# Patient Record
Sex: MALE | Race: WHITE | NOT HISPANIC OR LATINO | URBAN - METROPOLITAN AREA
[De-identification: names, ages, dates, MRNs, and addresses within clinical notes are randomized per-mention and may not be internally consistent; named-entity substitution may affect disease eponyms.]

---

## 2020-08-12 ENCOUNTER — OUT OF OFFICE VISIT (OUTPATIENT)
Dept: URBAN - METROPOLITAN AREA MEDICAL CENTER 1 | Facility: MEDICAL CENTER | Age: 50
End: 2020-08-12
Payer: MEDICARE

## 2020-08-12 DIAGNOSIS — R10.32 ABDOMINAL CRAMPING IN LEFT LOWER QUADRANT: ICD-10-CM

## 2020-08-12 DIAGNOSIS — R10.31 ABDOMINAL DISCOMFORT IN RIGHT LOWER QUADRANT: ICD-10-CM

## 2020-08-12 DIAGNOSIS — R93.3 ABN FINDINGS-GI TRACT: ICD-10-CM

## 2020-08-12 DIAGNOSIS — J44.1 CHRONIC OBSTRUCTIVE PULMONARY DISEASE WITH (ACUTE) EXACERBATION: ICD-10-CM

## 2020-08-12 PROCEDURE — G8427 DOCREV CUR MEDS BY ELIG CLIN: HCPCS | Performed by: INTERNAL MEDICINE

## 2020-08-12 PROCEDURE — 99232 SBSQ HOSP IP/OBS MODERATE 35: CPT | Performed by: INTERNAL MEDICINE

## 2020-08-12 PROCEDURE — 99233 SBSQ HOSP IP/OBS HIGH 50: CPT | Performed by: INTERNAL MEDICINE

## 2020-08-12 PROCEDURE — 99222 1ST HOSP IP/OBS MODERATE 55: CPT | Performed by: INTERNAL MEDICINE

## 2020-08-15 ENCOUNTER — OUT OF OFFICE VISIT (OUTPATIENT)
Dept: URBAN - METROPOLITAN AREA MEDICAL CENTER 1 | Facility: MEDICAL CENTER | Age: 50
End: 2020-08-15
Payer: MEDICARE

## 2020-08-15 DIAGNOSIS — R93.3 ABN FINDINGS-GI TRACT: ICD-10-CM

## 2020-08-15 DIAGNOSIS — R10.32 ABDOMINAL CRAMPING IN LEFT LOWER QUADRANT: ICD-10-CM

## 2020-08-15 DIAGNOSIS — R10.31 ABDOMINAL DISCOMFORT IN RIGHT LOWER QUADRANT: ICD-10-CM

## 2020-08-15 DIAGNOSIS — J44.1 CHRONIC OBSTRUCTIVE PULMONARY DISEASE WITH (ACUTE) EXACERBATION: ICD-10-CM

## 2020-08-15 PROCEDURE — 99232 SBSQ HOSP IP/OBS MODERATE 35: CPT | Performed by: INTERNAL MEDICINE

## 2020-08-18 ENCOUNTER — OUT OF OFFICE VISIT (OUTPATIENT)
Dept: URBAN - METROPOLITAN AREA MEDICAL CENTER 1 | Facility: MEDICAL CENTER | Age: 50
End: 2020-08-18
Payer: MEDICARE

## 2020-08-18 DIAGNOSIS — K31.89 ACQUIRED DEFORMITY OF DUODENUM: ICD-10-CM

## 2020-08-18 DIAGNOSIS — R93.3 ABN FINDINGS-GI TRACT: ICD-10-CM

## 2020-08-18 DIAGNOSIS — K62.3 RECTAL MUCOSA PROLAPSE: ICD-10-CM

## 2020-08-18 DIAGNOSIS — K63.5 BENIGN COLON POLYP: ICD-10-CM

## 2020-08-18 PROCEDURE — 43239 EGD BIOPSY SINGLE/MULTIPLE: CPT | Performed by: INTERNAL MEDICINE

## 2020-08-18 PROCEDURE — 45385 COLONOSCOPY W/LESION REMOVAL: CPT | Performed by: INTERNAL MEDICINE

## 2020-08-18 PROCEDURE — 45380 COLONOSCOPY AND BIOPSY: CPT | Performed by: INTERNAL MEDICINE

## 2020-08-18 PROCEDURE — G9937 DIG OR SURV COLSCO: HCPCS | Performed by: INTERNAL MEDICINE

## 2020-08-19 ENCOUNTER — OUT OF OFFICE VISIT (OUTPATIENT)
Dept: URBAN - METROPOLITAN AREA MEDICAL CENTER 1 | Facility: MEDICAL CENTER | Age: 50
End: 2020-08-19
Payer: MEDICARE

## 2020-08-19 DIAGNOSIS — R93.3 ABN FINDINGS-GI TRACT: ICD-10-CM

## 2020-08-19 DIAGNOSIS — J44.1 CHRONIC OBSTRUCTIVE PULMONARY DISEASE WITH (ACUTE) EXACERBATION: ICD-10-CM

## 2020-08-19 DIAGNOSIS — R10.32 ABDOMINAL CRAMPING IN LEFT LOWER QUADRANT: ICD-10-CM

## 2020-08-19 DIAGNOSIS — R10.31: ICD-10-CM

## 2020-08-19 PROCEDURE — 99232 SBSQ HOSP IP/OBS MODERATE 35: CPT | Performed by: INTERNAL MEDICINE

## 2020-08-31 ENCOUNTER — OFFICE VISIT (OUTPATIENT)
Dept: URBAN - NONMETROPOLITAN AREA CLINIC 2 | Facility: CLINIC | Age: 50
End: 2020-08-31
Payer: MEDICARE

## 2020-08-31 DIAGNOSIS — R93.5 ABNORMAL FINDINGS ON DIAGNOSTIC IMAGING OF ABDOMEN: ICD-10-CM

## 2020-08-31 DIAGNOSIS — K59.04 CHRONIC IDIOPATHIC CONSTIPATION: ICD-10-CM

## 2020-08-31 DIAGNOSIS — K25.9 GASTRIC ULCER DUE TO CHEMICAL: ICD-10-CM

## 2020-08-31 PROCEDURE — G9903 PT SCRN TBCO ID AS NON USER: HCPCS | Performed by: NURSE PRACTITIONER

## 2020-08-31 PROCEDURE — G8427 DOCREV CUR MEDS BY ELIG CLIN: HCPCS | Performed by: NURSE PRACTITIONER

## 2020-08-31 PROCEDURE — G8420 CALC BMI NORM PARAMETERS: HCPCS | Performed by: NURSE PRACTITIONER

## 2020-08-31 PROCEDURE — 99213 OFFICE O/P EST LOW 20 MIN: CPT | Performed by: NURSE PRACTITIONER

## 2020-08-31 RX ORDER — GABAPENTIN 100 MG/1
1 CAPSULE CAPSULE ORAL ONCE A DAY
Status: ACTIVE | COMMUNITY

## 2020-08-31 RX ORDER — ASPIRIN 81 MG/1
1 TABLET TABLET, COATED ORAL ONCE A DAY
Status: ACTIVE | COMMUNITY

## 2020-08-31 RX ORDER — MONTELUKAST SODIUM 10 MG/1
TAKE 1 TABLET (10 MG) BY ORAL ROUTE ONCE DAILY IN THE EVENING TABLET, FILM COATED ORAL 1
Qty: 0 | Refills: 0 | Status: ACTIVE | COMMUNITY
Start: 1900-01-01

## 2020-08-31 RX ORDER — METFORMIN HYDROCHLORIDE 1000 MG/1
1 TABLET WITH A MEAL TABLET, FILM COATED ORAL ONCE A DAY
Status: ACTIVE | COMMUNITY

## 2020-08-31 RX ORDER — ATORVASTATIN CALCIUM 40 MG/1
1 TABLET TABLET, FILM COATED ORAL ONCE A DAY
Status: ACTIVE | COMMUNITY

## 2020-08-31 RX ORDER — HYDROCODONE BITARTRATE AND ACETAMINOPHEN 5; 325 MG/1; MG/1
1 TABLET AS NEEDED TABLET ORAL
Status: ACTIVE | COMMUNITY

## 2020-08-31 RX ORDER — LORAZEPAM 2 MG/1
1 TABLET AT BEDTIME AS NEEDED TABLET ORAL ONCE A DAY
Status: ACTIVE | COMMUNITY

## 2020-08-31 RX ORDER — FUROSEMIDE 40 MG/1
TAKE 1 TABLET (40 MG) BY ORAL ROUTE 2 TIMES PER DAY TABLET ORAL 2
Qty: 0 | Refills: 0 | Status: ACTIVE | COMMUNITY
Start: 1900-01-01

## 2020-08-31 RX ORDER — HYDROXYZINE HYDROCHLORIDE 25 MG/ML
2 ML AS NEEDED INJECTION, SOLUTION INTRAMUSCULAR
Status: ACTIVE | COMMUNITY

## 2020-08-31 RX ORDER — PANTOPRAZOLE SODIUM 40 MG/1
1 TABLET TABLET, DELAYED RELEASE ORAL ONCE A DAY
Status: ACTIVE | COMMUNITY

## 2020-08-31 RX ORDER — SPIRONOLACTONE 50 MG/1
1 TABLET TABLET, FILM COATED ORAL ONCE A DAY
Status: ACTIVE | COMMUNITY

## 2020-08-31 RX ORDER — INSULIN HUMAN 500 [IU]/ML
AS DIRECTED INJECTION, SOLUTION SUBCUTANEOUS
Status: ACTIVE | COMMUNITY

## 2020-08-31 RX ORDER — CITALOPRAM 20 MG/1
TAKE 1 TABLET (20 MG) BY ORAL ROUTE ONCE DAILY TABLET ORAL 1
Qty: 0 | Refills: 0 | Status: ACTIVE | COMMUNITY
Start: 1900-01-01

## 2020-08-31 RX ORDER — LISINOPRIL 20 MG/1
TAKE 1 TABLET (20 MG) BY ORAL ROUTE ONCE DAILY TABLET ORAL 1
Qty: 0 | Refills: 0 | Status: ACTIVE | COMMUNITY
Start: 1900-01-01

## 2020-08-31 RX ORDER — HYDROCHLOROTHIAZIDE 25 MG/1
1 TABLET IN THE MORNING TABLET ORAL ONCE A DAY
Status: ACTIVE | COMMUNITY

## 2020-08-31 RX ORDER — BRIMONIDINE TARTRATE 2 MG/ML
1 DROP INTO AFFECTED EYE SOLUTION/ DROPS OPHTHALMIC
Status: ACTIVE | COMMUNITY

## 2020-08-31 RX ORDER — CEFDINIR 300 MG/1
AS DIRECTED CAPSULE ORAL
Status: ACTIVE | COMMUNITY

## 2020-08-31 RX ORDER — LACTULOSE 10 G/15ML
45 ML SOLUTION ORAL TID
Qty: 4050 ML | Refills: 6 | OUTPATIENT
Start: 2020-08-31 | End: 2021-03-29

## 2020-08-31 RX ORDER — INSULIN DETEMIR 100 [IU]/ML
INJECT BY SUBCUTANEOUS ROUTE PER PRESCRIBER'S INSTRUCTIONS. INSULIN DOSING REQUIRES INDIVIDUALIZATION INJECTION, SOLUTION SUBCUTANEOUS
Qty: 1 | Refills: 0 | Status: ACTIVE | COMMUNITY
Start: 1900-01-01

## 2020-08-31 NOTE — HPI-TODAY'S VISIT:
Mr. Rodriguez is here for hospital f/u. He started having a lot of abdominal pain and feeling weak bringing him to the ER for further evaluation. He had a CT scan that showed concentric wall thickening of small bowel with extensive inflammatory changes in the adjacent mesaenery. CTE 3 days later showed considerable inflammatory change with multiple areas of focal perforation with air in neha mesentery. He had an EGD with a 1cm ulcer in the stomach and colonoscopy showed erythema with ulcers in the ascending colon. Two hyperplastic were removed. Path returned with normal colon tissue, no hpylori, and chronic mild inflammation in the small bowel. He has been on antibx and still has a few days left. He admits to take 4 Goody powders a day along with alleve. He is on protonix with some mild improvement. The pain is worse after eating. He has chronic constipation. He is on lactulose which is helping. He denies any blood in his stool or melena. CS

## 2020-08-31 NOTE — PHYSICAL EXAM GASTROINTESTINAL
Abdomen , soft, right mid abdominal tenderness, nondistended , no guarding or rigidity , no masses palpable , normal bowel sounds , Liver and Spleen , no hepatosplenomegaly , liver nontender , spleen not palpable

## 2020-08-31 NOTE — PHYSICAL EXAM CONSTITUTIONAL:
well developed, obese , in no acute distress , ambulating without difficulty , normal communication ability

## 2020-08-31 NOTE — PHYSICAL EXAM HENT:
Head,  normocephalic,  atraumatic,  Face,  Face within normal limits,  Ears,  External ears within normal limits,  Nose/Nasopharynx,  External nose  normal appearance,  Lips,  Appearance normal

## 2020-08-31 NOTE — HPI-OTHER HISTORIES
2/23/2017  Mr. Juanito Rodriguez is here for f/u of abnormal imaging of his liver and spleen. He has COPD and sleep apnea. He has LE edema. He is on 3 liters of O2. His chronic liver work up is normal and his liver enzymes are normal. He has stopped drinking alcohol. His weight is down 8 pounds.  He has been complaining of chest pain for the last 2 months. The pain is mid chest and then radiated down both sides of his abdomen. The pain has no triggers. The pain will resolve on its own. He has heartburn which improves with TUMs. He has SOB after eating. He was started on omeprazole OTC. Since starting this, his chest pain is slightly better but continues.  He was having diarrhea 3-4 times a day. This had been going on the last 2 months. His metformin was increased to 2 a day about that same time. After his OV, his diarrhea resolved. He did not complete the stool studies. He is taking Align.  CS

## 2020-09-23 ENCOUNTER — OFFICE VISIT (OUTPATIENT)
Dept: URBAN - NONMETROPOLITAN AREA CLINIC 2 | Facility: CLINIC | Age: 50
End: 2020-09-23

## 2020-09-28 ENCOUNTER — LAB OUTSIDE AN ENCOUNTER (OUTPATIENT)
Dept: URBAN - NONMETROPOLITAN AREA CLINIC 2 | Facility: CLINIC | Age: 50
End: 2020-09-28

## 2020-09-30 ENCOUNTER — OFFICE VISIT (OUTPATIENT)
Dept: URBAN - NONMETROPOLITAN AREA CLINIC 2 | Facility: CLINIC | Age: 50
End: 2020-09-30

## 2020-10-07 ENCOUNTER — OFFICE VISIT (OUTPATIENT)
Dept: URBAN - NONMETROPOLITAN AREA CLINIC 2 | Facility: CLINIC | Age: 50
End: 2020-10-07
Payer: MEDICARE

## 2020-10-07 DIAGNOSIS — K25.9 GASTRIC ULCER DUE TO CHEMICAL: ICD-10-CM

## 2020-10-07 DIAGNOSIS — R93.5 ABNORMAL FINDINGS ON DIAGNOSTIC IMAGING OF ABDOMEN: ICD-10-CM

## 2020-10-07 DIAGNOSIS — K59.04 CHRONIC IDIOPATHIC CONSTIPATION: ICD-10-CM

## 2020-10-07 PROCEDURE — G9903 PT SCRN TBCO ID AS NON USER: HCPCS | Performed by: NURSE PRACTITIONER

## 2020-10-07 PROCEDURE — 99213 OFFICE O/P EST LOW 20 MIN: CPT | Performed by: NURSE PRACTITIONER

## 2020-10-07 PROCEDURE — G8427 DOCREV CUR MEDS BY ELIG CLIN: HCPCS | Performed by: NURSE PRACTITIONER

## 2020-10-07 PROCEDURE — G8417 CALC BMI ABV UP PARAM F/U: HCPCS | Performed by: NURSE PRACTITIONER

## 2020-10-07 RX ORDER — METFORMIN HYDROCHLORIDE 1000 MG/1
1 TABLET WITH A MEAL TABLET, FILM COATED ORAL ONCE A DAY
Status: ACTIVE | COMMUNITY

## 2020-10-07 RX ORDER — MONTELUKAST SODIUM 10 MG/1
TAKE 1 TABLET (10 MG) BY ORAL ROUTE ONCE DAILY IN THE EVENING TABLET, FILM COATED ORAL 1
Qty: 0 | Refills: 0 | Status: ACTIVE | COMMUNITY
Start: 1900-01-01

## 2020-10-07 RX ORDER — ATORVASTATIN CALCIUM 40 MG/1
1 TABLET TABLET, FILM COATED ORAL ONCE A DAY
Status: ACTIVE | COMMUNITY

## 2020-10-07 RX ORDER — LACTULOSE 10 G/15ML
45 ML SOLUTION ORAL TID
Qty: 4050 ML | Refills: 11 | OUTPATIENT

## 2020-10-07 RX ORDER — HYDROXYZINE HYDROCHLORIDE 25 MG/ML
2 ML AS NEEDED INJECTION, SOLUTION INTRAMUSCULAR
Status: ACTIVE | COMMUNITY

## 2020-10-07 RX ORDER — INSULIN DETEMIR 100 [IU]/ML
INJECT BY SUBCUTANEOUS ROUTE PER PRESCRIBER'S INSTRUCTIONS. INSULIN DOSING REQUIRES INDIVIDUALIZATION INJECTION, SOLUTION SUBCUTANEOUS
Qty: 1 | Refills: 0 | Status: ACTIVE | COMMUNITY
Start: 1900-01-01

## 2020-10-07 RX ORDER — HYDROCODONE BITARTRATE AND ACETAMINOPHEN 5; 325 MG/1; MG/1
1 TABLET AS NEEDED TABLET ORAL
Status: ACTIVE | COMMUNITY

## 2020-10-07 RX ORDER — BRIMONIDINE TARTRATE 2 MG/ML
1 DROP INTO AFFECTED EYE SOLUTION/ DROPS OPHTHALMIC
Status: ACTIVE | COMMUNITY

## 2020-10-07 RX ORDER — CITALOPRAM 20 MG/1
TAKE 1 TABLET (20 MG) BY ORAL ROUTE ONCE DAILY TABLET ORAL 1
Qty: 0 | Refills: 0 | Status: ACTIVE | COMMUNITY
Start: 1900-01-01

## 2020-10-07 RX ORDER — SPIRONOLACTONE 50 MG/1
1 TABLET TABLET, FILM COATED ORAL ONCE A DAY
Status: ACTIVE | COMMUNITY

## 2020-10-07 RX ORDER — ASPIRIN 81 MG/1
1 TABLET TABLET, COATED ORAL ONCE A DAY
Status: ACTIVE | COMMUNITY

## 2020-10-07 RX ORDER — GABAPENTIN 100 MG/1
1 CAPSULE CAPSULE ORAL ONCE A DAY
Status: ACTIVE | COMMUNITY

## 2020-10-07 RX ORDER — HYDROCHLOROTHIAZIDE 25 MG/1
1 TABLET IN THE MORNING TABLET ORAL ONCE A DAY
Status: ACTIVE | COMMUNITY

## 2020-10-07 RX ORDER — FUROSEMIDE 40 MG/1
TAKE 1 TABLET (40 MG) BY ORAL ROUTE 2 TIMES PER DAY TABLET ORAL 2
Qty: 0 | Refills: 0 | Status: ACTIVE | COMMUNITY
Start: 1900-01-01

## 2020-10-07 RX ORDER — PANTOPRAZOLE SODIUM 40 MG/1
1 TABLET TABLET, DELAYED RELEASE ORAL ONCE A DAY
Qty: 90 | Refills: 3

## 2020-10-07 RX ORDER — LISINOPRIL 20 MG/1
TAKE 1 TABLET (20 MG) BY ORAL ROUTE ONCE DAILY TABLET ORAL 1
Qty: 0 | Refills: 0 | Status: ACTIVE | COMMUNITY
Start: 1900-01-01

## 2020-10-07 RX ORDER — PANTOPRAZOLE SODIUM 40 MG/1
1 TABLET TABLET, DELAYED RELEASE ORAL ONCE A DAY
Status: ACTIVE | COMMUNITY

## 2020-10-07 RX ORDER — INSULIN HUMAN 500 [IU]/ML
AS DIRECTED INJECTION, SOLUTION SUBCUTANEOUS
Status: ACTIVE | COMMUNITY

## 2020-10-07 RX ORDER — LORAZEPAM 2 MG/1
1 TABLET AT BEDTIME AS NEEDED TABLET ORAL ONCE A DAY
Status: ACTIVE | COMMUNITY

## 2020-10-07 RX ORDER — LACTULOSE 10 G/15ML
45 ML SOLUTION ORAL TID
Qty: 4050 ML | Refills: 6 | Status: ACTIVE | COMMUNITY
Start: 2020-08-31 | End: 2021-03-29

## 2020-10-07 RX ORDER — CEFDINIR 300 MG/1
AS DIRECTED CAPSULE ORAL
Status: ACTIVE | COMMUNITY

## 2020-10-07 NOTE — HPI-OTHER HISTORIES
2/23/2017  Mr. Juanito Rodriguez is here for f/u of abnormal imaging of his liver and spleen. He has COPD and sleep apnea. He has LE edema. He is on 3 liters of O2. His chronic liver work up is normal and his liver enzymes are normal. He has stopped drinking alcohol. His weight is down 8 pounds.  He has been complaining of chest pain for the last 2 months. The pain is mid chest and then radiated down both sides of his abdomen. The pain has no triggers. The pain will resolve on its own. He has heartburn which improves with TUMs. He has SOB after eating. He was started on omeprazole OTC. Since starting this, his chest pain is slightly better but continues.  He was having diarrhea 3-4 times a day. This had been going on the last 2 months. His metformin was increased to 2 a day about that same time. After his OV, his diarrhea resolved. He did not complete the stool studies. He is taking Align.     8/31/2020 Mr. Rodriguez is here for hospital f/u. He started having a lot of abdominal pain and feeling weak bringing him to the ER for further evaluation. He had a CT scan that showed concentric wall thickening of small bowel with extensive inflammatory changes in the adjacent mesaenery. CTE 3 days later showed considerable inflammatory change with multiple areas of focal perforation with air in neha mesentery. He had an EGD with a 1cm ulcer in the stomach and colonoscopy showed erythema with ulcers in the ascending colon. Two hyperplastic were removed. Path returned with normal colon tissue, no hpylori, and chronic mild inflammation in the small bowel. He has been on antibx and still has a few days left. He admits to take 4 Goody powders a day along with alleve. He is on protonix with some mild improvement. The pain is worse after eating. He has chronic constipation. He is on lactulose which is helping. He denies any blood in his stool or melena.

## 2020-10-07 NOTE — PHYSICAL EXAM CONSTITUTIONAL:
chronically ill appearing, obese , in no acute distress , ambulating with difficulty , normal communication ability

## 2020-10-07 NOTE — HPI-TODAY'S VISIT:
Mr. Rodriguez is here for f/u of enteritis. In August, he had a CT scan that showed concentric wall thickening of small bowel with extensive inflammatory changes in the adjacent mesaenery and a  CTE that showed considerable inflammatory change with multiple areas of focal perforation with air in the mesentery. He had an EGD with a 1cm ulcer in the stomach and colonoscopy showed erythema with ulcers in the ascending colon. Two hyperplastic were removed. Path returned with normal colon tissue, no hpylori, and chronic mild inflammation in the small bowel. He was treated with antibx and protonix. He admited to take 4 Goody powders a day along with alleve. He was advised to continue protonix and stop all NSAIDs. He has repeat imaging 9/11 that showed considerable improvement in his inflammation with only a very small collection of air relatively confined. He denies any abdominal pain. He denies any constipation after starting the lactulose. From a GI standpoint, he feels well.  He has been having issues with his kidneys and lungs. [facility]. CS

## 2021-04-05 ENCOUNTER — LAB OUTSIDE AN ENCOUNTER (OUTPATIENT)
Dept: URBAN - NONMETROPOLITAN AREA CLINIC 2 | Facility: CLINIC | Age: 51
End: 2021-04-05

## 2021-04-07 ENCOUNTER — OFFICE VISIT (OUTPATIENT)
Dept: URBAN - NONMETROPOLITAN AREA CLINIC 2 | Facility: CLINIC | Age: 51
End: 2021-04-07
Payer: MEDICARE

## 2021-04-07 VITALS
BODY MASS INDEX: 41.99 KG/M2 | HEART RATE: 118 BPM | SYSTOLIC BLOOD PRESSURE: 128 MMHG | TEMPERATURE: 98.5 F | DIASTOLIC BLOOD PRESSURE: 69 MMHG | WEIGHT: 310 LBS | HEIGHT: 72 IN

## 2021-04-07 DIAGNOSIS — R93.5 ABNORMAL FINDINGS ON DIAGNOSTIC IMAGING OF ABDOMEN: ICD-10-CM

## 2021-04-07 DIAGNOSIS — K59.04 CHRONIC IDIOPATHIC CONSTIPATION: ICD-10-CM

## 2021-04-07 DIAGNOSIS — K25.9 GASTRIC ULCER DUE TO CHEMICAL: ICD-10-CM

## 2021-04-07 PROCEDURE — 99213 OFFICE O/P EST LOW 20 MIN: CPT | Performed by: NURSE PRACTITIONER

## 2021-04-07 RX ORDER — HYDROCODONE BITARTRATE AND ACETAMINOPHEN 5; 325 MG/1; MG/1
1 TABLET AS NEEDED TABLET ORAL
Status: ACTIVE | COMMUNITY

## 2021-04-07 RX ORDER — LACTULOSE 10 G/15ML
45 ML SOLUTION ORAL TID
Qty: 4050 ML | Refills: 11 | Status: ACTIVE | COMMUNITY

## 2021-04-07 RX ORDER — HYDROCHLOROTHIAZIDE 25 MG/1
1 TABLET IN THE MORNING TABLET ORAL ONCE A DAY
Status: ACTIVE | COMMUNITY

## 2021-04-07 RX ORDER — INSULIN DETEMIR 100 [IU]/ML
INJECT BY SUBCUTANEOUS ROUTE PER PRESCRIBER'S INSTRUCTIONS. INSULIN DOSING REQUIRES INDIVIDUALIZATION INJECTION, SOLUTION SUBCUTANEOUS
Qty: 1 | Refills: 0 | Status: ACTIVE | COMMUNITY
Start: 1900-01-01

## 2021-04-07 RX ORDER — METFORMIN HYDROCHLORIDE 1000 MG/1
1 TABLET WITH A MEAL TABLET, FILM COATED ORAL ONCE A DAY
Status: ACTIVE | COMMUNITY

## 2021-04-07 RX ORDER — FUROSEMIDE 40 MG/1
TAKE 1 TABLET (40 MG) BY ORAL ROUTE 2 TIMES PER DAY TABLET ORAL 2
Qty: 0 | Refills: 0 | Status: ACTIVE | COMMUNITY
Start: 1900-01-01

## 2021-04-07 RX ORDER — PANTOPRAZOLE SODIUM 40 MG/1
1 TABLET TABLET, DELAYED RELEASE ORAL ONCE A DAY
Qty: 90 | Refills: 3 | Status: ACTIVE | COMMUNITY

## 2021-04-07 RX ORDER — INSULIN HUMAN 500 [IU]/ML
AS DIRECTED INJECTION, SOLUTION SUBCUTANEOUS
Status: ACTIVE | COMMUNITY

## 2021-04-07 RX ORDER — PANTOPRAZOLE SODIUM 20 MG/1
1 TABLET TABLET, DELAYED RELEASE ORAL ONCE A DAY
Qty: 90 | Refills: 3

## 2021-04-07 RX ORDER — GABAPENTIN 100 MG/1
1 CAPSULE CAPSULE ORAL ONCE A DAY
Status: ACTIVE | COMMUNITY

## 2021-04-07 RX ORDER — FAMOTIDINE 20 MG/1
1 TABLET AT BEDTIME AS NEEDED TABLET, FILM COATED ORAL ONCE A DAY
Qty: 90 TABLET | Refills: 11 | OUTPATIENT
Start: 2021-04-07

## 2021-04-07 RX ORDER — ASPIRIN 81 MG/1
1 TABLET TABLET, COATED ORAL ONCE A DAY
Status: ACTIVE | COMMUNITY

## 2021-04-07 RX ORDER — LISINOPRIL 20 MG/1
TAKE 1 TABLET (20 MG) BY ORAL ROUTE ONCE DAILY TABLET ORAL 1
Qty: 0 | Refills: 0 | Status: ACTIVE | COMMUNITY
Start: 1900-01-01

## 2021-04-07 RX ORDER — BRIMONIDINE TARTRATE 2 MG/ML
1 DROP INTO AFFECTED EYE SOLUTION/ DROPS OPHTHALMIC
Status: ACTIVE | COMMUNITY

## 2021-04-07 RX ORDER — CITALOPRAM 20 MG/1
TAKE 1 TABLET (20 MG) BY ORAL ROUTE ONCE DAILY TABLET ORAL 1
Qty: 0 | Refills: 0 | Status: ACTIVE | COMMUNITY
Start: 1900-01-01

## 2021-04-07 RX ORDER — HYDROXYZINE HYDROCHLORIDE 25 MG/ML
2 ML AS NEEDED INJECTION, SOLUTION INTRAMUSCULAR
Status: ACTIVE | COMMUNITY

## 2021-04-07 RX ORDER — LACTULOSE 10 G/15ML
45 ML SOLUTION ORAL TID
Qty: 4050 ML | Refills: 11 | OUTPATIENT

## 2021-04-07 RX ORDER — SPIRONOLACTONE 50 MG/1
1 TABLET TABLET, FILM COATED ORAL ONCE A DAY
Status: ACTIVE | COMMUNITY

## 2021-04-07 RX ORDER — ATORVASTATIN CALCIUM 40 MG/1
1 TABLET TABLET, FILM COATED ORAL ONCE A DAY
Status: ACTIVE | COMMUNITY

## 2021-04-07 RX ORDER — CEFDINIR 300 MG/1
AS DIRECTED CAPSULE ORAL
Status: ACTIVE | COMMUNITY

## 2021-04-07 RX ORDER — MONTELUKAST SODIUM 10 MG/1
TAKE 1 TABLET (10 MG) BY ORAL ROUTE ONCE DAILY IN THE EVENING TABLET, FILM COATED ORAL 1
Qty: 0 | Refills: 0 | Status: ACTIVE | COMMUNITY
Start: 1900-01-01

## 2021-04-07 RX ORDER — LORAZEPAM 2 MG/1
1 TABLET AT BEDTIME AS NEEDED TABLET ORAL ONCE A DAY
Status: ACTIVE | COMMUNITY

## 2021-04-07 NOTE — HPI-TODAY'S VISIT:
4/7/2021 Mr. Rodriguez is here for f/u of enteritis. In August, he had a CT scan that showed concentric wall thickening of small bowel with extensive inflammatory changes in the adjacent mesaenery and a  CTE that showed considerable inflammatory change with multiple areas of focal perforation with air in the mesentery. He had an EGD with a 1cm ulcer in the stomach and colonoscopy showed erythema with ulcers in the ascending colon. Two hyperplastic were removed. Path returned with normal colon tissue, no hpylori, and chronic mild inflammation in the small bowel. He was treated with antibx and protonix. He had repeat imaging 9/11 that showed considerable improvement in his inflammation with only a very small collection of air relatively confined. His last CT was 3/29/2021 with resolving inflammation in neha anterior right mid abdomen mesentery.  He denies any abdominal pain. He has stopped all NSAIDs. He is drinking about one beer a couple times a month. He is drinking about 1 caffeine drink a day. He is taking protonix 40mg daily without any breakthrough.  He denies any constipation after starting the lactulose. He has been trying to loss weight. Overall, he is feeling well. CS

## 2021-04-07 NOTE — PHYSICAL EXAM CONSTITUTIONAL:
well developed, well nourished , obese in no acute distress , ambulating with difficulty , normal communication ability

## 2021-04-07 NOTE — HPI-OTHER HISTORIES
2/23/2017  Mr. Juanito Rodriguez is here for f/u of abnormal imaging of his liver and spleen. He has COPD and sleep apnea. He has LE edema. He is on 3 liters of O2. His chronic liver work up is normal and his liver enzymes are normal. He has stopped drinking alcohol. His weight is down 8 pounds.  He has been complaining of chest pain for the last 2 months. The pain is mid chest and then radiated down both sides of his abdomen. The pain has no triggers. The pain will resolve on its own. He has heartburn which improves with TUMs. He has SOB after eating. He was started on omeprazole OTC. Since starting this, his chest pain is slightly better but continues.  He was having diarrhea 3-4 times a day. This had been going on the last 2 months. His metformin was increased to 2 a day about that same time. After his OV, his diarrhea resolved. He did not complete the stool studies. He is taking Align.  CS   8/31/2020 Mr. Rodriguez is here for hospital f/u. He started having a lot of abdominal pain and feeling weak bringing him to the ER for further evaluation. He had a CT scan that showed concentric wall thickening of small bowel with extensive inflammatory changes in the adjacent mesaenery. CTE 3 days later showed considerable inflammatory change with multiple areas of focal perforation with air in neha mesentery. He had an EGD with a 1cm ulcer in the stomach and colonoscopy showed erythema with ulcers in the ascending colon. Two hyperplastic were removed. Path returned with normal colon tissue, no hpylori, and chronic mild inflammation in the small bowel. He has been on antibx and still has a few days left. He admits to take 4 Goody powders a day along with alleve. He is on protonix with some mild improvement. The pain is worse after eating. He has chronic constipation. He is on lactulose which is helping. He denies any blood in his stool or melena. CS   10/7/2020 Mr. Rodriguez is here for f/u of enteritis. In August, he had a CT scan that showed concentric wall thickening of small bowel with extensive inflammatory changes in the adjacent mesaenery and a  CTE that showed considerable inflammatory change with multiple areas of focal perforation with air in the mesentery. He had an EGD with a 1cm ulcer in the stomach and colonoscopy showed erythema with ulcers in the ascending colon. Two hyperplastic were removed. Path returned with normal colon tissue, no hpylori, and chronic mild inflammation in the small bowel. He was treated with antibx and protonix. He admited to take 4 Goody powders a day along with alleve. He was advised to continue protonix and stop all NSAIDs. He has repeat imaging 9/11 that showed considerable improvement in his inflammation with only a very small collection of air relatively confined. He denies any abdominal pain. He denies any constipation after starting the lactulose. From a GI standpoint, he feels well.  He has been having issues with his kidneys and lungs. [facility]. CS

## 2021-05-28 ENCOUNTER — ERX REFILL RESPONSE (OUTPATIENT)
Dept: URBAN - NONMETROPOLITAN AREA CLINIC 2 | Facility: CLINIC | Age: 51
End: 2021-05-28

## 2021-05-28 RX ORDER — LACTULOSE 10 G/15ML
TAKE 45 ML BY MOUTH THREE TIMES A DAY SOLUTION ORAL; RECTAL
Qty: 4050 | Refills: 5

## 2021-10-03 ENCOUNTER — ERX REFILL RESPONSE (OUTPATIENT)
Dept: URBAN - NONMETROPOLITAN AREA CLINIC 2 | Facility: CLINIC | Age: 51
End: 2021-10-03

## 2021-10-03 RX ORDER — PANTOPRAZOLE SODIUM 40 MG/1
TAKE ONE TABLET BY MOUTH ONE TIME DAILY TABLET, DELAYED RELEASE ORAL
Qty: 90 TABLET | Refills: 4 | OUTPATIENT

## 2021-10-06 ENCOUNTER — OFFICE VISIT (OUTPATIENT)
Dept: URBAN - NONMETROPOLITAN AREA CLINIC 2 | Facility: CLINIC | Age: 51
End: 2021-10-06
Payer: MEDICARE

## 2021-10-06 ENCOUNTER — WEB ENCOUNTER (OUTPATIENT)
Dept: URBAN - NONMETROPOLITAN AREA CLINIC 2 | Facility: CLINIC | Age: 51
End: 2021-10-06

## 2021-10-06 VITALS
TEMPERATURE: 97.8 F | DIASTOLIC BLOOD PRESSURE: 89 MMHG | HEART RATE: 104 BPM | HEIGHT: 72 IN | WEIGHT: 306.6 LBS | BODY MASS INDEX: 41.53 KG/M2 | SYSTOLIC BLOOD PRESSURE: 118 MMHG

## 2021-10-06 DIAGNOSIS — R93.5 ABNORMAL FINDINGS ON DIAGNOSTIC IMAGING OF ABDOMEN: ICD-10-CM

## 2021-10-06 DIAGNOSIS — K25.9 GASTRIC ULCER DUE TO CHEMICAL: ICD-10-CM

## 2021-10-06 DIAGNOSIS — K59.04 CHRONIC IDIOPATHIC CONSTIPATION: ICD-10-CM

## 2021-10-06 PROCEDURE — 99213 OFFICE O/P EST LOW 20 MIN: CPT | Performed by: NURSE PRACTITIONER

## 2021-10-06 RX ORDER — LORAZEPAM 2 MG/1
1 TABLET AT BEDTIME AS NEEDED TABLET ORAL ONCE A DAY
Status: ACTIVE | COMMUNITY

## 2021-10-06 RX ORDER — METFORMIN HYDROCHLORIDE 1000 MG/1
1 TABLET WITH A MEAL TABLET, FILM COATED ORAL ONCE A DAY
Status: ACTIVE | COMMUNITY

## 2021-10-06 RX ORDER — ATORVASTATIN CALCIUM 40 MG/1
1 TABLET TABLET, FILM COATED ORAL ONCE A DAY
Status: ACTIVE | COMMUNITY

## 2021-10-06 RX ORDER — FAMOTIDINE 20 MG/1
1 TABLET AT BEDTIME AS NEEDED TABLET, FILM COATED ORAL ONCE A DAY
Qty: 90 TABLET | Refills: 11 | Status: ACTIVE | COMMUNITY
Start: 2021-04-07

## 2021-10-06 RX ORDER — HYDROXYZINE HYDROCHLORIDE 25 MG/ML
2 ML AS NEEDED INJECTION, SOLUTION INTRAMUSCULAR
Status: ACTIVE | COMMUNITY

## 2021-10-06 RX ORDER — PANTOPRAZOLE SODIUM 20 MG/1
1 TABLET TABLET, DELAYED RELEASE ORAL ONCE A DAY
Qty: 90 | Refills: 3

## 2021-10-06 RX ORDER — BRIMONIDINE TARTRATE 2 MG/ML
1 DROP INTO AFFECTED EYE SOLUTION/ DROPS OPHTHALMIC
Status: ACTIVE | COMMUNITY

## 2021-10-06 RX ORDER — INSULIN DETEMIR 100 [IU]/ML
INJECT BY SUBCUTANEOUS ROUTE PER PRESCRIBER'S INSTRUCTIONS. INSULIN DOSING REQUIRES INDIVIDUALIZATION INJECTION, SOLUTION SUBCUTANEOUS
Qty: 1 | Refills: 0 | Status: ACTIVE | COMMUNITY
Start: 1900-01-01

## 2021-10-06 RX ORDER — SPIRONOLACTONE 50 MG/1
1 TABLET TABLET, FILM COATED ORAL ONCE A DAY
Status: ACTIVE | COMMUNITY

## 2021-10-06 RX ORDER — CITALOPRAM 20 MG/1
TAKE 1 TABLET (20 MG) BY ORAL ROUTE ONCE DAILY TABLET ORAL 1
Qty: 0 | Refills: 0 | Status: ACTIVE | COMMUNITY
Start: 1900-01-01

## 2021-10-06 RX ORDER — LISINOPRIL 20 MG/1
TAKE 1 TABLET (20 MG) BY ORAL ROUTE ONCE DAILY TABLET ORAL 1
Qty: 0 | Refills: 0 | Status: ACTIVE | COMMUNITY
Start: 1900-01-01

## 2021-10-06 RX ORDER — ASPIRIN 81 MG/1
1 TABLET TABLET, COATED ORAL ONCE A DAY
Status: ACTIVE | COMMUNITY

## 2021-10-06 RX ORDER — LACTULOSE 10 G/15ML
TAKE 45 ML BY MOUTH THREE TIMES A DAY SOLUTION ORAL; RECTAL
Qty: 4050 | Refills: 5 | Status: ACTIVE | COMMUNITY

## 2021-10-06 RX ORDER — MONTELUKAST SODIUM 10 MG/1
TAKE 1 TABLET (10 MG) BY ORAL ROUTE ONCE DAILY IN THE EVENING TABLET, FILM COATED ORAL 1
Qty: 0 | Refills: 0 | Status: ACTIVE | COMMUNITY
Start: 1900-01-01

## 2021-10-06 RX ORDER — FUROSEMIDE 40 MG/1
TAKE 1 TABLET (40 MG) BY ORAL ROUTE 2 TIMES PER DAY TABLET ORAL 2
Qty: 0 | Refills: 0 | Status: ACTIVE | COMMUNITY
Start: 1900-01-01

## 2021-10-06 RX ORDER — CEFDINIR 300 MG/1
AS DIRECTED CAPSULE ORAL
Status: ACTIVE | COMMUNITY

## 2021-10-06 RX ORDER — HYDROCODONE BITARTRATE AND ACETAMINOPHEN 5; 325 MG/1; MG/1
1 TABLET AS NEEDED TABLET ORAL
Status: ACTIVE | COMMUNITY

## 2021-10-06 RX ORDER — LACTULOSE 10 G/15ML
45 ML SOLUTION ORAL TID
Qty: 4050 ML | Refills: 11 | OUTPATIENT

## 2021-10-06 RX ORDER — GABAPENTIN 100 MG/1
1 CAPSULE CAPSULE ORAL ONCE A DAY
Status: ACTIVE | COMMUNITY

## 2021-10-06 RX ORDER — FAMOTIDINE 20 MG/1
1 TABLET AS NEEDED TABLET, FILM COATED ORAL BID
Qty: 180 TABLET | Refills: 11 | OUTPATIENT

## 2021-10-06 RX ORDER — LACTULOSE 10 G/15ML
45 ML SOLUTION ORAL TID
Qty: 4050 ML | Refills: 11 | Status: ACTIVE | COMMUNITY

## 2021-10-06 RX ORDER — PANTOPRAZOLE SODIUM 40 MG/1
TAKE ONE TABLET BY MOUTH ONE TIME DAILY TABLET, DELAYED RELEASE ORAL
Qty: 90 TABLET | Refills: 4 | Status: ACTIVE | COMMUNITY

## 2021-10-06 RX ORDER — HYDROCHLOROTHIAZIDE 25 MG/1
1 TABLET IN THE MORNING TABLET ORAL ONCE A DAY
Status: ACTIVE | COMMUNITY

## 2021-10-06 RX ORDER — INSULIN HUMAN 500 [IU]/ML
AS DIRECTED INJECTION, SOLUTION SUBCUTANEOUS
Status: ACTIVE | COMMUNITY

## 2021-10-06 RX ORDER — PANTOPRAZOLE SODIUM 20 MG/1
1 TABLET TABLET, DELAYED RELEASE ORAL ONCE A DAY
Qty: 90 | Refills: 3 | Status: ACTIVE | COMMUNITY

## 2021-10-06 NOTE — HPI-TODAY'S VISIT:
10/6/2021 Mr. Rodriguez is here for f/u of enteritis and GERD. In August 2020, he had a CT scan that showed concentric wall thickening of small bowel with extensive inflammatory changes in the adjacent mesaenery and a  CTE that showed considerable inflammatory change with multiple areas of focal perforation with air in the mesentery. He had an EGD with a 1cm ulcer in the stomach and colonoscopy showed erythema with ulcers in the ascending colon. His last CT was 3/29/2021 with resolving inflammation in the anterior right mid abdomen mesentery.  He denies any abdominal pain. He has stopped all NSAIDs. He is taking protonix 20mg daily and using TUMS prn. He has indigestion depending on what he eats. He is having good BM with lactulose. He has lost some weight and his breathing has gotten better. His BS are also better controlled. Overall, he is feeling much better and hoping to continue losing weight. CS

## 2021-10-06 NOTE — HPI-OTHER HISTORIES
2/23/2017  Mr. Juanito Rodriguez is here for f/u of abnormal imaging of his liver and spleen. He has COPD and sleep apnea. He has LE edema. He is on 3 liters of O2. His chronic liver work up is normal and his liver enzymes are normal. He has stopped drinking alcohol. His weight is down 8 pounds.  He has been complaining of chest pain for the last 2 months. The pain is mid chest and then radiated down both sides of his abdomen. The pain has no triggers. The pain will resolve on its own. He has heartburn which improves with TUMs. He has SOB after eating. He was started on omeprazole OTC. Since starting this, his chest pain is slightly better but continues.  He was having diarrhea 3-4 times a day. This had been going on the last 2 months. His metformin was increased to 2 a day about that same time. After his OV, his diarrhea resolved. He did not complete the stool studies. He is taking Align.  CS   8/31/2020 Mr. Rodriguez is here for hospital f/u. He started having a lot of abdominal pain and feeling weak bringing him to the ER for further evaluation. He had a CT scan that showed concentric wall thickening of small bowel with extensive inflammatory changes in the adjacent mesaenery. CTE 3 days later showed considerable inflammatory change with multiple areas of focal perforation with air in neha mesentery. He had an EGD with a 1cm ulcer in the stomach and colonoscopy showed erythema with ulcers in the ascending colon. Two hyperplastic were removed. Path returned with normal colon tissue, no hpylori, and chronic mild inflammation in the small bowel. He has been on antibx and still has a few days left. He admits to take 4 Goody powders a day along with alleve. He is on protonix with some mild improvement. The pain is worse after eating. He has chronic constipation. He is on lactulose which is helping. He denies any blood in his stool or melena. CS   10/7/2020 Mr. Rodriguez is here for f/u of enteritis. In August, he had a CT scan that showed concentric wall thickening of small bowel with extensive inflammatory changes in the adjacent mesaenery and a  CTE that showed considerable inflammatory change with multiple areas of focal perforation with air in the mesentery. He had an EGD with a 1cm ulcer in the stomach and colonoscopy showed erythema with ulcers in the ascending colon. Two hyperplastic were removed. Path returned with normal colon tissue, no hpylori, and chronic mild inflammation in the small bowel. He was treated with antibx and protonix. He admited to take 4 Goody powders a day along with alleve. He was advised to continue protonix and stop all NSAIDs. He has repeat imaging 9/11 that showed considerable improvement in his inflammation with only a very small collection of air relatively confined. He denies any abdominal pain. He denies any constipation after starting the lactulose. From a GI standpoint, he feels well.  He has been having issues with his kidneys and lungs. He was recently in the [facility] for a week. CS   4/7/2021 Mr. Rodriguez is here for f/u of enteritis. In August, he had a CT scan that showed concentric wall thickening of small bowel with extensive inflammatory changes in the adjacent mesaenery and a  CTE that showed considerable inflammatory change with multiple areas of focal perforation with air in the mesentery. He had an EGD with a 1cm ulcer in the stomach and colonoscopy showed erythema with ulcers in the ascending colon. Two hyperplastic were removed. Path returned with normal colon tissue, no hpylori, and chronic mild inflammation in the small bowel. He was treated with antibx and protonix. He had repeat imaging 9/11 that showed considerable improvement in his inflammation with only a very small collection of air relatively confined. His last CT was 3/29/2021 with resolving inflammation in neha anterior right mid abdomen mesentery.  He denies any abdominal pain. He has stopped all NSAIDs. He is drinking about one beer a couple times a month. He is drinking about 1 caffeine drink a day. He is taking protonix 40mg daily without any breakthrough.  He denies any constipation after starting the lactulose. He has been trying to loss weight. Overall, he is feeling well. CS

## 2022-04-06 ENCOUNTER — OFFICE VISIT (OUTPATIENT)
Dept: URBAN - NONMETROPOLITAN AREA CLINIC 2 | Facility: CLINIC | Age: 52
End: 2022-04-06

## 2022-04-13 ENCOUNTER — OFFICE VISIT (OUTPATIENT)
Dept: URBAN - NONMETROPOLITAN AREA CLINIC 2 | Facility: CLINIC | Age: 52
End: 2022-04-13
Payer: MEDICARE

## 2022-04-13 VITALS
HEIGHT: 72 IN | BODY MASS INDEX: 42.66 KG/M2 | SYSTOLIC BLOOD PRESSURE: 121 MMHG | DIASTOLIC BLOOD PRESSURE: 81 MMHG | WEIGHT: 315 LBS | HEART RATE: 105 BPM | TEMPERATURE: 98 F

## 2022-04-13 DIAGNOSIS — K25.9 GASTRIC ULCER DUE TO CHEMICAL: ICD-10-CM

## 2022-04-13 DIAGNOSIS — K59.04 CHRONIC IDIOPATHIC CONSTIPATION: ICD-10-CM

## 2022-04-13 DIAGNOSIS — R93.5 ABNORMAL FINDINGS ON DIAGNOSTIC IMAGING OF ABDOMEN: ICD-10-CM

## 2022-04-13 PROCEDURE — 99213 OFFICE O/P EST LOW 20 MIN: CPT | Performed by: NURSE PRACTITIONER

## 2022-04-13 RX ORDER — ASPIRIN 81 MG/1
1 TABLET TABLET, COATED ORAL ONCE A DAY
Status: ACTIVE | COMMUNITY

## 2022-04-13 RX ORDER — CEFDINIR 300 MG/1
AS DIRECTED CAPSULE ORAL
Status: ACTIVE | COMMUNITY

## 2022-04-13 RX ORDER — PANTOPRAZOLE SODIUM 40 MG/1
1 TABLET TABLET, DELAYED RELEASE ORAL BID
Qty: 180 | Refills: 3

## 2022-04-13 RX ORDER — INSULIN HUMAN 500 [IU]/ML
AS DIRECTED INJECTION, SOLUTION SUBCUTANEOUS
Status: ACTIVE | COMMUNITY

## 2022-04-13 RX ORDER — HYDROCODONE BITARTRATE AND ACETAMINOPHEN 5; 325 MG/1; MG/1
1 TABLET AS NEEDED TABLET ORAL
Status: ACTIVE | COMMUNITY

## 2022-04-13 RX ORDER — METFORMIN HYDROCHLORIDE 1000 MG/1
1 TABLET WITH A MEAL TABLET, FILM COATED ORAL ONCE A DAY
Status: ACTIVE | COMMUNITY

## 2022-04-13 RX ORDER — FUROSEMIDE 40 MG/1
TAKE 1 TABLET (40 MG) BY ORAL ROUTE 2 TIMES PER DAY TABLET ORAL 2
Qty: 0 | Refills: 0 | Status: ACTIVE | COMMUNITY
Start: 1900-01-01

## 2022-04-13 RX ORDER — FAMOTIDINE 40 MG/1
1 TABLET AT BEDTIME AS NEEDED TABLET, FILM COATED ORAL ONCE A DAY
Qty: 90 TABLET | Refills: 3 | OUTPATIENT

## 2022-04-13 RX ORDER — BRIMONIDINE TARTRATE 2 MG/ML
1 DROP INTO AFFECTED EYE SOLUTION/ DROPS OPHTHALMIC
Status: ACTIVE | COMMUNITY

## 2022-04-13 RX ORDER — LORAZEPAM 2 MG/1
1 TABLET AT BEDTIME AS NEEDED TABLET ORAL ONCE A DAY
Status: ACTIVE | COMMUNITY

## 2022-04-13 RX ORDER — MONTELUKAST SODIUM 10 MG/1
TAKE 1 TABLET (10 MG) BY ORAL ROUTE ONCE DAILY IN THE EVENING TABLET, FILM COATED ORAL 1
Qty: 0 | Refills: 0 | Status: ACTIVE | COMMUNITY
Start: 1900-01-01

## 2022-04-13 RX ORDER — ATORVASTATIN CALCIUM 40 MG/1
1 TABLET TABLET, FILM COATED ORAL ONCE A DAY
Status: ACTIVE | COMMUNITY

## 2022-04-13 RX ORDER — PANTOPRAZOLE SODIUM 40 MG/1
TAKE ONE TABLET BY MOUTH ONE TIME DAILY TABLET, DELAYED RELEASE ORAL
Qty: 90 TABLET | Refills: 4 | Status: ACTIVE | COMMUNITY

## 2022-04-13 RX ORDER — GABAPENTIN 100 MG/1
1 CAPSULE CAPSULE ORAL ONCE A DAY
Status: ACTIVE | COMMUNITY

## 2022-04-13 RX ORDER — HYDROXYZINE HYDROCHLORIDE 25 MG/ML
2 ML AS NEEDED INJECTION, SOLUTION INTRAMUSCULAR
Status: ACTIVE | COMMUNITY

## 2022-04-13 RX ORDER — PANTOPRAZOLE SODIUM 20 MG/1
1 TABLET TABLET, DELAYED RELEASE ORAL ONCE A DAY
Qty: 90 | Refills: 3 | Status: ACTIVE | COMMUNITY

## 2022-04-13 RX ORDER — INSULIN DETEMIR 100 [IU]/ML
INJECT BY SUBCUTANEOUS ROUTE PER PRESCRIBER'S INSTRUCTIONS. INSULIN DOSING REQUIRES INDIVIDUALIZATION INJECTION, SOLUTION SUBCUTANEOUS
Qty: 1 | Refills: 0 | Status: ACTIVE | COMMUNITY
Start: 1900-01-01

## 2022-04-13 RX ORDER — CITALOPRAM 20 MG/1
TAKE 1 TABLET (20 MG) BY ORAL ROUTE ONCE DAILY TABLET ORAL 1
Qty: 0 | Refills: 0 | Status: ACTIVE | COMMUNITY
Start: 1900-01-01

## 2022-04-13 RX ORDER — LACTULOSE 10 G/15ML
45 ML SOLUTION ORAL TID
Qty: 4050 ML | Refills: 11 | OUTPATIENT

## 2022-04-13 RX ORDER — LISINOPRIL 20 MG/1
TAKE 1 TABLET (20 MG) BY ORAL ROUTE ONCE DAILY TABLET ORAL 1
Qty: 0 | Refills: 0 | Status: ACTIVE | COMMUNITY
Start: 1900-01-01

## 2022-04-13 RX ORDER — FAMOTIDINE 20 MG/1
1 TABLET AS NEEDED TABLET, FILM COATED ORAL BID
Qty: 180 TABLET | Refills: 11 | Status: ACTIVE | COMMUNITY

## 2022-04-13 RX ORDER — LACTULOSE 10 G/15ML
45 ML SOLUTION ORAL TID
Qty: 4050 ML | Refills: 11 | Status: ACTIVE | COMMUNITY

## 2022-04-13 RX ORDER — HYDROCHLOROTHIAZIDE 25 MG/1
1 TABLET IN THE MORNING TABLET ORAL ONCE A DAY
Status: ACTIVE | COMMUNITY

## 2022-04-13 RX ORDER — SPIRONOLACTONE 50 MG/1
1 TABLET TABLET, FILM COATED ORAL ONCE A DAY
Status: ACTIVE | COMMUNITY

## 2022-04-13 RX ORDER — LACTULOSE 10 G/15ML
TAKE 45 ML BY MOUTH THREE TIMES A DAY SOLUTION ORAL; RECTAL
Qty: 4050 | Refills: 5 | Status: ACTIVE | COMMUNITY

## 2022-04-13 NOTE — HPI-TODAY'S VISIT:
4/13/2022 Mr. Rodriguez is here for f/u of GERD. In 2020,  he had an EGD with a 1cm ulcer in the stomach and colonoscopy showed erythema with ulcers in the ascending colon. This was thought to be related to Goody's/NSAIDs. His last CT was 3/29/2021 with resolving inflammation in the anterior right mid abdomen mesentery.  He denies any abdominal pain. He has stopped all NSAIDs.At his last OV, he was taking protonix 20mg daily and using TUMS prn and feeling well. In December, he got COVID and was hospitalized for 7 days in the ICU. He went home on 4 liters of O2. He now down to 2.5 liters. He is having worsening reflux and tums does not always cover his symptoms. His constipation is well controlled with lactulose. CS

## 2022-04-13 NOTE — HPI-OTHER HISTORIES
2/23/2017  Mr. Juanito Rodriguez is here for f/u of abnormal imaging of his liver and spleen. He has COPD and sleep apnea. He has LE edema. He is on 3 liters of O2. His chronic liver work up is normal and his liver enzymes are normal. He has stopped drinking alcohol. His weight is down 8 pounds.  He has been complaining of chest pain for the last 2 months. The pain is mid chest and then radiated down both sides of his abdomen. The pain has no triggers. The pain will resolve on its own. He has heartburn which improves with TUMs. He has SOB after eating. He was started on omeprazole OTC. Since starting this, his chest pain is slightly better but continues.  He was having diarrhea 3-4 times a day. This had been going on the last 2 months. His metformin was increased to 2 a day about that same time. After his OV, his diarrhea resolved. He did not complete the stool studies. He is taking Align.  CS   8/31/2020 Mr. Rodriguez is here for hospital f/u. He started having a lot of abdominal pain and feeling weak bringing him to the ER for further evaluation. He had a CT scan that showed concentric wall thickening of small bowel with extensive inflammatory changes in the adjacent mesaenery. CTE 3 days later showed considerable inflammatory change with multiple areas of focal perforation with air in neha mesentery. He had an EGD with a 1cm ulcer in the stomach and colonoscopy showed erythema with ulcers in the ascending colon. Two hyperplastic were removed. Path returned with normal colon tissue, no hpylori, and chronic mild inflammation in the small bowel. He has been on antibx and still has a few days left. He admits to take 4 Goody powders a day along with alleve. He is on protonix with some mild improvement. The pain is worse after eating. He has chronic constipation. He is on lactulose which is helping. He denies any blood in his stool or melena. CS   10/7/2020 Mr. Rodriguez is here for f/u of enteritis. In August, he had a CT scan that showed concentric wall thickening of small bowel with extensive inflammatory changes in the adjacent mesaenery and a  CTE that showed considerable inflammatory change with multiple areas of focal perforation with air in the mesentery. He had an EGD with a 1cm ulcer in the stomach and colonoscopy showed erythema with ulcers in the ascending colon. Two hyperplastic were removed. Path returned with normal colon tissue, no hpylori, and chronic mild inflammation in the small bowel. He was treated with antibx and protonix. He admited to take 4 Goody powders a day along with alleve. He was advised to continue protonix and stop all NSAIDs. He has repeat imaging 9/11 that showed considerable improvement in his inflammation with only a very small collection of air relatively confined. He denies any abdominal pain. He denies any constipation after starting the lactulose. From a GI standpoint, he feels well.  He has been having issues with his kidneys and lungs. He was recently in the [facility] for a week. CS   4/7/2021 Mr. Rodriguez is here for f/u of enteritis. In August, he had a CT scan that showed concentric wall thickening of small bowel with extensive inflammatory changes in the adjacent mesaenery and a  CTE that showed considerable inflammatory change with multiple areas of focal perforation with air in the mesentery. He had an EGD with a 1cm ulcer in the stomach and colonoscopy showed erythema with ulcers in the ascending colon. Two hyperplastic were removed. Path returned with normal colon tissue, no hpylori, and chronic mild inflammation in the small bowel. He was treated with antibx and protonix. He had repeat imaging 9/11 that showed considerable improvement in his inflammation with only a very small collection of air relatively confined. His last CT was 3/29/2021 with resolving inflammation in neha anterior right mid abdomen mesentery.  He denies any abdominal pain. He has stopped all NSAIDs. He is drinking about one beer a couple times a month. He is drinking about 1 caffeine drink a day. He is taking protonix 40mg daily without any breakthrough.  He denies any constipation after starting the lactulose. He has been trying to loss weight. Overall, he is feeling well. CS   10/6/2021 Mr. Rodriguez is here for f/u of enteritis and GERD. In August 2020, he had a CT scan that showed concentric wall thickening of small bowel with extensive inflammatory changes in the adjacent mesaenery and a  CTE that showed considerable inflammatory change with multiple areas of focal perforation with air in the mesentery. He had an EGD with a 1cm ulcer in the stomach and colonoscopy showed erythema with ulcers in the ascending colon. His last CT was 3/29/2021 with resolving inflammation in the anterior right mid abdomen mesentery.  He denies any abdominal pain. He has stopped all NSAIDs. He is taking protonix 20mg daily and using TUMS prn. He has indigestion depending on what he eats. He is having good BM with lactulose. He has lost some weight and his breathing has gotten better. His BS are also better controlled. Overall, he is feeling much better and hoping to continue losing weight. CS

## 2022-06-08 ENCOUNTER — TELEPHONE ENCOUNTER (OUTPATIENT)
Dept: URBAN - NONMETROPOLITAN AREA CLINIC 2 | Facility: CLINIC | Age: 52
End: 2022-06-08

## 2022-06-08 RX ORDER — FAMOTIDINE 40 MG/1
1 TABLET AT BEDTIME AS NEEDED TABLET, FILM COATED ORAL ONCE A DAY
Qty: 90 TABLET | Refills: 3

## 2022-06-08 RX ORDER — PANTOPRAZOLE SODIUM 40 MG/1
1 TABLET TABLET, DELAYED RELEASE ORAL TWICE A DAY
Qty: 180 TABLET | Refills: 3

## 2022-07-20 ENCOUNTER — OFFICE VISIT (OUTPATIENT)
Dept: URBAN - NONMETROPOLITAN AREA CLINIC 2 | Facility: CLINIC | Age: 52
End: 2022-07-20

## 2022-09-20 ENCOUNTER — OFFICE VISIT (OUTPATIENT)
Dept: URBAN - NONMETROPOLITAN AREA CLINIC 13 | Facility: CLINIC | Age: 52
End: 2022-09-20

## 2022-10-11 ENCOUNTER — ERX REFILL RESPONSE (OUTPATIENT)
Dept: URBAN - NONMETROPOLITAN AREA CLINIC 2 | Facility: CLINIC | Age: 52
End: 2022-10-11

## 2022-10-11 RX ORDER — FAMOTIDINE 40 MG/1
1 TABLET AT BEDTIME AS NEEDED TABLET, FILM COATED ORAL ONCE A DAY
Qty: 90 TABLET | Refills: 3 | OUTPATIENT

## 2022-10-14 ENCOUNTER — TELEPHONE ENCOUNTER (OUTPATIENT)
Dept: URBAN - NONMETROPOLITAN AREA CLINIC 2 | Facility: CLINIC | Age: 52
End: 2022-10-14

## 2022-10-14 RX ORDER — FAMOTIDINE 40 MG/1
1 TABLET AT BEDTIME AS NEEDED TABLET, FILM COATED ORAL ONCE A DAY
Qty: 90 TABLET | Refills: 3

## 2022-10-25 ENCOUNTER — OFFICE VISIT (OUTPATIENT)
Dept: URBAN - NONMETROPOLITAN AREA CLINIC 13 | Facility: CLINIC | Age: 52
End: 2022-10-25
Payer: MEDICARE

## 2022-10-25 VITALS
WEIGHT: 315 LBS | HEART RATE: 114 BPM | HEIGHT: 72 IN | DIASTOLIC BLOOD PRESSURE: 57 MMHG | BODY MASS INDEX: 42.66 KG/M2 | SYSTOLIC BLOOD PRESSURE: 95 MMHG

## 2022-10-25 DIAGNOSIS — K59.04 CHRONIC IDIOPATHIC CONSTIPATION: ICD-10-CM

## 2022-10-25 DIAGNOSIS — R93.5 ABNORMAL FINDINGS ON DIAGNOSTIC IMAGING OF ABDOMEN: ICD-10-CM

## 2022-10-25 DIAGNOSIS — K25.9 GASTRIC ULCER DUE TO CHEMICAL: ICD-10-CM

## 2022-10-25 PROBLEM — 82934008: Status: ACTIVE | Noted: 2020-08-31

## 2022-10-25 PROCEDURE — 99213 OFFICE O/P EST LOW 20 MIN: CPT | Performed by: NURSE PRACTITIONER

## 2022-10-25 RX ORDER — PANTOPRAZOLE SODIUM 40 MG/1
1 TABLET TABLET, DELAYED RELEASE ORAL TWICE A DAY
Qty: 180 TABLET | Refills: 3 | Status: ACTIVE | COMMUNITY

## 2022-10-25 RX ORDER — SPIRONOLACTONE 50 MG/1
1 TABLET TABLET, FILM COATED ORAL ONCE A DAY
Status: ACTIVE | COMMUNITY

## 2022-10-25 RX ORDER — FAMOTIDINE 40 MG/1
1 TABLET AT BEDTIME AS NEEDED TABLET, FILM COATED ORAL ONCE A DAY
Qty: 90 TABLET | Refills: 3 | Status: ACTIVE | COMMUNITY

## 2022-10-25 RX ORDER — LISINOPRIL 20 MG/1
TAKE 1 TABLET (20 MG) BY ORAL ROUTE ONCE DAILY TABLET ORAL 1
Qty: 0 | Refills: 0 | Status: ACTIVE | COMMUNITY
Start: 1900-01-01

## 2022-10-25 RX ORDER — ATORVASTATIN CALCIUM 40 MG/1
1 TABLET TABLET, FILM COATED ORAL ONCE A DAY
Status: ACTIVE | COMMUNITY

## 2022-10-25 RX ORDER — HYDROCODONE BITARTRATE AND ACETAMINOPHEN 5; 325 MG/1; MG/1
1 TABLET AS NEEDED TABLET ORAL
Status: ACTIVE | COMMUNITY

## 2022-10-25 RX ORDER — HYDROXYZINE HYDROCHLORIDE 25 MG/ML
2 ML AS NEEDED INJECTION, SOLUTION INTRAMUSCULAR
Status: ACTIVE | COMMUNITY

## 2022-10-25 RX ORDER — METFORMIN HYDROCHLORIDE 1000 MG/1
1 TABLET WITH A MEAL TABLET, FILM COATED ORAL ONCE A DAY
Status: ACTIVE | COMMUNITY

## 2022-10-25 RX ORDER — HYDROCHLOROTHIAZIDE 25 MG/1
1 TABLET IN THE MORNING TABLET ORAL ONCE A DAY
Status: ACTIVE | COMMUNITY

## 2022-10-25 RX ORDER — CEFDINIR 300 MG/1
AS DIRECTED CAPSULE ORAL
Status: ACTIVE | COMMUNITY

## 2022-10-25 RX ORDER — FUROSEMIDE 40 MG/1
TAKE 1 TABLET (40 MG) BY ORAL ROUTE 2 TIMES PER DAY TABLET ORAL 2
Qty: 0 | Refills: 0 | Status: ACTIVE | COMMUNITY
Start: 1900-01-01

## 2022-10-25 RX ORDER — ASPIRIN 81 MG/1
1 TABLET TABLET, COATED ORAL ONCE A DAY
Status: ACTIVE | COMMUNITY

## 2022-10-25 RX ORDER — PANTOPRAZOLE SODIUM 40 MG/1
TAKE ONE TABLET BY MOUTH ONE TIME DAILY TABLET, DELAYED RELEASE ORAL
Qty: 90 TABLET | Refills: 4 | Status: ACTIVE | COMMUNITY

## 2022-10-25 RX ORDER — FAMOTIDINE 40 MG/1
1 TABLET AT BEDTIME AS NEEDED TABLET, FILM COATED ORAL ONCE A DAY
Qty: 90 TABLET | Refills: 3 | OUTPATIENT

## 2022-10-25 RX ORDER — CITALOPRAM 20 MG/1
TAKE 1 TABLET (20 MG) BY ORAL ROUTE ONCE DAILY TABLET ORAL 1
Qty: 0 | Refills: 0 | Status: ACTIVE | COMMUNITY
Start: 1900-01-01

## 2022-10-25 RX ORDER — LACTULOSE 10 G/15ML
45 ML SOLUTION ORAL TID
Qty: 4050 ML | Refills: 11 | Status: ACTIVE | COMMUNITY

## 2022-10-25 RX ORDER — LACTULOSE 10 G/15ML
45 ML SOLUTION ORAL TID
Qty: 4050 ML | Refills: 11 | OUTPATIENT

## 2022-10-25 RX ORDER — GABAPENTIN 100 MG/1
1 CAPSULE CAPSULE ORAL ONCE A DAY
Status: ACTIVE | COMMUNITY

## 2022-10-25 RX ORDER — INSULIN DETEMIR 100 [IU]/ML
INJECT BY SUBCUTANEOUS ROUTE PER PRESCRIBER'S INSTRUCTIONS. INSULIN DOSING REQUIRES INDIVIDUALIZATION INJECTION, SOLUTION SUBCUTANEOUS
Qty: 1 | Refills: 0 | Status: ACTIVE | COMMUNITY
Start: 1900-01-01

## 2022-10-25 RX ORDER — INSULIN HUMAN 500 [IU]/ML
AS DIRECTED INJECTION, SOLUTION SUBCUTANEOUS
Status: ACTIVE | COMMUNITY

## 2022-10-25 RX ORDER — SUCRALFATE 1 G
1 TABLET DISSOLVED IN A TABLESPOON OF WATER ON AN EMPTY STOMACH TABLET ORAL TWICE A DAY
Qty: 60 TABLET | Refills: 2 | OUTPATIENT
Start: 2022-10-25 | End: 2023-01-22

## 2022-10-25 RX ORDER — LACTULOSE 10 G/15ML
TAKE 45 ML BY MOUTH THREE TIMES A DAY SOLUTION ORAL; RECTAL
Qty: 4050 | Refills: 5 | Status: ACTIVE | COMMUNITY

## 2022-10-25 RX ORDER — MONTELUKAST SODIUM 10 MG/1
TAKE 1 TABLET (10 MG) BY ORAL ROUTE ONCE DAILY IN THE EVENING TABLET, FILM COATED ORAL 1
Qty: 0 | Refills: 0 | Status: ACTIVE | COMMUNITY
Start: 1900-01-01

## 2022-10-25 RX ORDER — PANTOPRAZOLE SODIUM 40 MG/1
1 TABLET TABLET, DELAYED RELEASE ORAL BID
Qty: 180 | Refills: 3

## 2022-10-25 RX ORDER — LORAZEPAM 2 MG/1
1 TABLET AT BEDTIME AS NEEDED TABLET ORAL ONCE A DAY
Status: ACTIVE | COMMUNITY

## 2022-10-25 RX ORDER — BRIMONIDINE TARTRATE 2 MG/ML
1 DROP INTO AFFECTED EYE SOLUTION/ DROPS OPHTHALMIC
Status: ACTIVE | COMMUNITY

## 2022-10-25 NOTE — HPI-OTHER HISTORIES
2/23/2017  Mr. Juanito Rodriguez is here for f/u of abnormal imaging of his liver and spleen. He has COPD and sleep apnea. He has LE edema. He is on 3 liters of O2. His chronic liver work up is normal and his liver enzymes are normal. He has stopped drinking alcohol. His weight is down 8 pounds.  He has been complaining of chest pain for the last 2 months. The pain is mid chest and then radiated down both sides of his abdomen. The pain has no triggers. The pain will resolve on its own. He has heartburn which improves with TUMs. He has SOB after eating. He was started on omeprazole OTC. Since starting this, his chest pain is slightly better but continues.  He was having diarrhea 3-4 times a day. This had been going on the last 2 months. His metformin was increased to 2 a day about that same time. After his OV, his diarrhea resolved. He did not complete the stool studies. He is taking Align.  CS   8/31/2020 Mr. Rodriguez is here for hospital f/u. He started having a lot of abdominal pain and feeling weak bringing him to the ER for further evaluation. He had a CT scan that showed concentric wall thickening of small bowel with extensive inflammatory changes in the adjacent mesaenery. CTE 3 days later showed considerable inflammatory change with multiple areas of focal perforation with air in neha mesentery. He had an EGD with a 1cm ulcer in the stomach and colonoscopy showed erythema with ulcers in the ascending colon. Two hyperplastic were removed. Path returned with normal colon tissue, no hpylori, and chronic mild inflammation in the small bowel. He has been on antibx and still has a few days left. He admits to take 4 Goody powders a day along with alleve. He is on protonix with some mild improvement. The pain is worse after eating. He has chronic constipation. He is on lactulose which is helping. He denies any blood in his stool or melena. CS   10/7/2020 Mr. Rodriguez is here for f/u of enteritis. In August, he had a CT scan that showed concentric wall thickening of small bowel with extensive inflammatory changes in the adjacent mesaenery and a  CTE that showed considerable inflammatory change with multiple areas of focal perforation with air in the mesentery. He had an EGD with a 1cm ulcer in the stomach and colonoscopy showed erythema with ulcers in the ascending colon. Two hyperplastic were removed. Path returned with normal colon tissue, no hpylori, and chronic mild inflammation in the small bowel. He was treated with antibx and protonix. He admited to take 4 Goody powders a day along with alleve. He was advised to continue protonix and stop all NSAIDs. He has repeat imaging 9/11 that showed considerable improvement in his inflammation with only a very small collection of air relatively confined. He denies any abdominal pain. He denies any constipation after starting the lactulose. From a GI standpoint, he feels well.  He has been having issues with his kidneys and lungs. He was recently in the [facility] for a week. CS   4/7/2021 Mr. Rodriguez is here for f/u of enteritis. In August, he had a CT scan that showed concentric wall thickening of small bowel with extensive inflammatory changes in the adjacent mesaenery and a  CTE that showed considerable inflammatory change with multiple areas of focal perforation with air in the mesentery. He had an EGD with a 1cm ulcer in the stomach and colonoscopy showed erythema with ulcers in the ascending colon. Two hyperplastic were removed. Path returned with normal colon tissue, no hpylori, and chronic mild inflammation in the small bowel. He was treated with antibx and protonix. He had repeat imaging 9/11 that showed considerable improvement in his inflammation with only a very small collection of air relatively confined. His last CT was 3/29/2021 with resolving inflammation in neha anterior right mid abdomen mesentery.  He denies any abdominal pain. He has stopped all NSAIDs. He is drinking about one beer a couple times a month. He is drinking about 1 caffeine drink a day. He is taking protonix 40mg daily without any breakthrough.  He denies any constipation after starting the lactulose. He has been trying to loss weight. Overall, he is feeling well. CS   10/6/2021 Mr. Rodriguez is here for f/u of enteritis and GERD. In August 2020, he had a CT scan that showed concentric wall thickening of small bowel with extensive inflammatory changes in the adjacent mesaenery and a  CTE that showed considerable inflammatory change with multiple areas of focal perforation with air in the mesentery. He had an EGD with a 1cm ulcer in the stomach and colonoscopy showed erythema with ulcers in the ascending colon. His last CT was 3/29/2021 with resolving inflammation in the anterior right mid abdomen mesentery.  He denies any abdominal pain. He has stopped all NSAIDs. He is taking protonix 20mg daily and using TUMS prn. He has indigestion depending on what he eats. He is having good BM with lactulose. He has lost some weight and his breathing has gotten better. His BS are also better controlled. Overall, he is feeling much better and hoping to continue losing weight. CS  4/13/2022 Mr. Rodriguez is here for f/u of GERD. In 2020,  he had an EGD with a 1cm ulcer in the stomach and colonoscopy showed erythema with ulcers in the ascending colon. This was thought to be related to Goody's/NSAIDs. His last CT was 3/29/2021 with resolving inflammation in the anterior right mid abdomen mesentery.  He denies any abdominal pain. He has stopped all NSAIDs.At his last OV, he was taking protonix 20mg daily and using TUMS prn and feeling well. In December, he got COVID and was hospitalized for 7 days in the ICU. He went home on 4 liters of O2. He now down to 2.5 liters. He is having worsening reflux and tums does not always cover his symptoms. His constipation is well controlled with lactulose. CS

## 2022-10-25 NOTE — HPI-TODAY'S VISIT:
10/25/2022 Mr. Rodriguez is here for f/u of GERD. In 2020,  he had an EGD with a 1cm ulcer in the stomach and colonoscopy showed erythema with ulcers in the ascending colon. This was thought to be related to Goody's/NSAIDs. His last CT was 3/29/2021 with resolving inflammation in the anterior right mid abdomen mesentery.  He denies any abdominal pain. He has stopped all NSAIDs. He continues to struggle with his reflux despite protonix 40mg BID and pepcid at bedtime. He often needs tums. he has been working out but gained weight. His constipation is well controlled with lactulose.  He continues to have to wear O2. CS

## 2023-01-20 ENCOUNTER — TELEPHONE ENCOUNTER (OUTPATIENT)
Dept: URBAN - NONMETROPOLITAN AREA CLINIC 13 | Facility: CLINIC | Age: 53
End: 2023-01-20

## 2023-01-20 RX ORDER — SUCRALFATE 1 G
1 TABLET DISSOLVED IN A TABLESPOON OF WATER ON AN EMPTY STOMACH TABLET ORAL TWICE A DAY
Qty: 180 TABLET | Refills: 2
Start: 2022-10-25 | End: 2023-10-19

## 2023-04-25 ENCOUNTER — LAB OUTSIDE AN ENCOUNTER (OUTPATIENT)
Dept: URBAN - NONMETROPOLITAN AREA CLINIC 13 | Facility: CLINIC | Age: 53
End: 2023-04-25

## 2023-04-25 ENCOUNTER — OFFICE VISIT (OUTPATIENT)
Dept: URBAN - NONMETROPOLITAN AREA CLINIC 13 | Facility: CLINIC | Age: 53
End: 2023-04-25
Payer: MEDICARE

## 2023-04-25 VITALS
HEIGHT: 72 IN | SYSTOLIC BLOOD PRESSURE: 143 MMHG | HEART RATE: 115 BPM | BODY MASS INDEX: 42.66 KG/M2 | WEIGHT: 315 LBS | DIASTOLIC BLOOD PRESSURE: 83 MMHG

## 2023-04-25 DIAGNOSIS — K25.9 GASTRIC ULCER DUE TO CHEMICAL: ICD-10-CM

## 2023-04-25 DIAGNOSIS — R93.5 ABNORMAL FINDINGS ON DIAGNOSTIC IMAGING OF ABDOMEN: ICD-10-CM

## 2023-04-25 PROCEDURE — 99214 OFFICE O/P EST MOD 30 MIN: CPT | Performed by: NURSE PRACTITIONER

## 2023-04-25 RX ORDER — PANTOPRAZOLE SODIUM 40 MG/1
TAKE ONE TABLET BY MOUTH ONE TIME DAILY TABLET, DELAYED RELEASE ORAL
Qty: 90 TABLET | Refills: 4 | Status: ACTIVE | COMMUNITY

## 2023-04-25 RX ORDER — CEFDINIR 300 MG/1
AS DIRECTED CAPSULE ORAL
Status: ACTIVE | COMMUNITY

## 2023-04-25 RX ORDER — METFORMIN HYDROCHLORIDE 1000 MG/1
1 TABLET WITH A MEAL TABLET, FILM COATED ORAL ONCE A DAY
Status: ACTIVE | COMMUNITY

## 2023-04-25 RX ORDER — MONTELUKAST SODIUM 10 MG/1
TAKE 1 TABLET (10 MG) BY ORAL ROUTE ONCE DAILY IN THE EVENING TABLET, FILM COATED ORAL 1
Qty: 0 | Refills: 0 | Status: ACTIVE | COMMUNITY
Start: 1900-01-01

## 2023-04-25 RX ORDER — HYDROXYZINE HYDROCHLORIDE 25 MG/ML
2 ML AS NEEDED INJECTION, SOLUTION INTRAMUSCULAR
Status: ACTIVE | COMMUNITY

## 2023-04-25 RX ORDER — GABAPENTIN 100 MG/1
1 CAPSULE CAPSULE ORAL ONCE A DAY
Status: ACTIVE | COMMUNITY

## 2023-04-25 RX ORDER — ATORVASTATIN CALCIUM 40 MG/1
1 TABLET TABLET, FILM COATED ORAL ONCE A DAY
Status: ACTIVE | COMMUNITY

## 2023-04-25 RX ORDER — FAMOTIDINE 40 MG/1
1 TABLET AT BEDTIME AS NEEDED TABLET, FILM COATED ORAL ONCE A DAY
Qty: 90 TABLET | Refills: 3 | Status: ACTIVE | COMMUNITY

## 2023-04-25 RX ORDER — LORAZEPAM 2 MG/1
1 TABLET AT BEDTIME AS NEEDED TABLET ORAL ONCE A DAY
Status: ACTIVE | COMMUNITY

## 2023-04-25 RX ORDER — FUROSEMIDE 40 MG/1
TAKE 1 TABLET (40 MG) BY ORAL ROUTE 2 TIMES PER DAY TABLET ORAL 2
Qty: 0 | Refills: 0 | Status: ACTIVE | COMMUNITY
Start: 1900-01-01

## 2023-04-25 RX ORDER — SUCRALFATE 1 G
1 TABLET DISSOLVED IN A TABLESPOON OF WATER ON AN EMPTY STOMACH TABLET ORAL TWICE A DAY
Qty: 180 TABLET | Refills: 2 | Status: ACTIVE | COMMUNITY
Start: 2022-10-25 | End: 2023-10-19

## 2023-04-25 RX ORDER — PANTOPRAZOLE SODIUM 40 MG/1
1 TABLET TABLET, DELAYED RELEASE ORAL BID
Qty: 180 | Refills: 3 | Status: ACTIVE | COMMUNITY

## 2023-04-25 RX ORDER — LACTULOSE 10 G/15ML
45 ML SOLUTION ORAL TID
Qty: 4050 ML | Refills: 11 | OUTPATIENT

## 2023-04-25 RX ORDER — INSULIN DETEMIR 100 [IU]/ML
INJECT BY SUBCUTANEOUS ROUTE PER PRESCRIBER'S INSTRUCTIONS. INSULIN DOSING REQUIRES INDIVIDUALIZATION INJECTION, SOLUTION SUBCUTANEOUS
Qty: 1 | Refills: 0 | Status: ACTIVE | COMMUNITY
Start: 1900-01-01

## 2023-04-25 RX ORDER — LISINOPRIL 20 MG/1
TAKE 1 TABLET (20 MG) BY ORAL ROUTE ONCE DAILY TABLET ORAL 1
Qty: 0 | Refills: 0 | Status: ACTIVE | COMMUNITY
Start: 1900-01-01

## 2023-04-25 RX ORDER — ASPIRIN 81 MG/1
1 TABLET TABLET, COATED ORAL ONCE A DAY
Status: ACTIVE | COMMUNITY

## 2023-04-25 RX ORDER — LACTULOSE 10 G/15ML
TAKE 45 ML BY MOUTH THREE TIMES A DAY SOLUTION ORAL; RECTAL
Qty: 4050 | Refills: 5 | Status: ACTIVE | COMMUNITY

## 2023-04-25 RX ORDER — CITALOPRAM 20 MG/1
TAKE 1 TABLET (20 MG) BY ORAL ROUTE ONCE DAILY TABLET ORAL 1
Qty: 0 | Refills: 0 | Status: ACTIVE | COMMUNITY
Start: 1900-01-01

## 2023-04-25 RX ORDER — PANTOPRAZOLE SODIUM 40 MG/1
1 TABLET TABLET, DELAYED RELEASE ORAL BID
Qty: 180 | Refills: 3

## 2023-04-25 RX ORDER — INSULIN HUMAN 500 [IU]/ML
AS DIRECTED INJECTION, SOLUTION SUBCUTANEOUS
Status: ACTIVE | COMMUNITY

## 2023-04-25 RX ORDER — BRIMONIDINE TARTRATE 2 MG/ML
1 DROP INTO AFFECTED EYE SOLUTION/ DROPS OPHTHALMIC
Status: ACTIVE | COMMUNITY

## 2023-04-25 RX ORDER — HYDROCHLOROTHIAZIDE 25 MG/1
1 TABLET IN THE MORNING TABLET ORAL ONCE A DAY
Status: ACTIVE | COMMUNITY

## 2023-04-25 RX ORDER — SPIRONOLACTONE 50 MG/1
1 TABLET TABLET, FILM COATED ORAL ONCE A DAY
Status: ACTIVE | COMMUNITY

## 2023-04-25 RX ORDER — HYDROCODONE BITARTRATE AND ACETAMINOPHEN 5; 325 MG/1; MG/1
1 TABLET AS NEEDED TABLET ORAL
Status: ACTIVE | COMMUNITY

## 2023-04-25 RX ORDER — LACTULOSE 10 G/15ML
45 ML SOLUTION ORAL TID
Qty: 4050 ML | Refills: 11 | Status: ACTIVE | COMMUNITY

## 2023-04-25 RX ORDER — FAMOTIDINE 40 MG/1
1 TABLET AT BEDTIME AS NEEDED TABLET, FILM COATED ORAL ONCE A DAY
Qty: 90 TABLET | Refills: 3 | OUTPATIENT

## 2023-04-25 NOTE — HPI-TODAY'S VISIT:
4/25/2023 Mr. Rodriguez is here for f/u of GERD. In 2020,  he had an EGD with a 1cm ulcer in the stomach and colonoscopy showed erythema with ulcers in the ascending colon. This was thought to be related to Goody's/NSAIDs. His last CT was 3/29/2021 with resolving inflammation in the anterior right mid abdomen mesentery.  At his last, continued to have protonix 40mg BID and pepcid at bedtime so carafate was added. Today, his reflux is better but he continues to have abdominal pain. he has been taking ibuprofen.  He wears NC O2. CS

## 2023-04-25 NOTE — HPI-OTHER HISTORIES
2/23/2017  Mr. Juanito Rodriguez is here for f/u of abnormal imaging of his liver and spleen. He has COPD and sleep apnea. He has LE edema. He is on 3 liters of O2. His chronic liver work up is normal and his liver enzymes are normal. He has stopped drinking alcohol. His weight is down 8 pounds.  He has been complaining of chest pain for the last 2 months. The pain is mid chest and then radiated down both sides of his abdomen. The pain has no triggers. The pain will resolve on its own. He has heartburn which improves with TUMs. He has SOB after eating. He was started on omeprazole OTC. Since starting this, his chest pain is slightly better but continues.  He was having diarrhea 3-4 times a day. This had been going on the last 2 months. His metformin was increased to 2 a day about that same time. After his OV, his diarrhea resolved. He did not complete the stool studies. He is taking Align.  CS   8/31/2020 Mr. Rodriguez is here for hospital f/u. He started having a lot of abdominal pain and feeling weak bringing him to the ER for further evaluation. He had a CT scan that showed concentric wall thickening of small bowel with extensive inflammatory changes in the adjacent mesaenery. CTE 3 days later showed considerable inflammatory change with multiple areas of focal perforation with air in neha mesentery. He had an EGD with a 1cm ulcer in the stomach and colonoscopy showed erythema with ulcers in the ascending colon. Two hyperplastic were removed. Path returned with normal colon tissue, no hpylori, and chronic mild inflammation in the small bowel. He has been on antibx and still has a few days left. He admits to take 4 Goody powders a day along with alleve. He is on protonix with some mild improvement. The pain is worse after eating. He has chronic constipation. He is on lactulose which is helping. He denies any blood in his stool or melena. CS   10/7/2020 Mr. Rodriguez is here for f/u of enteritis. In August, he had a CT scan that showed concentric wall thickening of small bowel with extensive inflammatory changes in the adjacent mesaenery and a  CTE that showed considerable inflammatory change with multiple areas of focal perforation with air in the mesentery. He had an EGD with a 1cm ulcer in the stomach and colonoscopy showed erythema with ulcers in the ascending colon. Two hyperplastic were removed. Path returned with normal colon tissue, no hpylori, and chronic mild inflammation in the small bowel. He was treated with antibx and protonix. He admited to take 4 Goody powders a day along with alleve. He was advised to continue protonix and stop all NSAIDs. He has repeat imaging 9/11 that showed considerable improvement in his inflammation with only a very small collection of air relatively confined. He denies any abdominal pain. He denies any constipation after starting the lactulose. From a GI standpoint, he feels well.  He has been having issues with his kidneys and lungs. He was recently in the [facility] for a week. CS   4/7/2021 Mr. Rodriguez is here for f/u of enteritis. In August, he had a CT scan that showed concentric wall thickening of small bowel with extensive inflammatory changes in the adjacent mesaenery and a  CTE that showed considerable inflammatory change with multiple areas of focal perforation with air in the mesentery. He had an EGD with a 1cm ulcer in the stomach and colonoscopy showed erythema with ulcers in the ascending colon. Two hyperplastic were removed. Path returned with normal colon tissue, no hpylori, and chronic mild inflammation in the small bowel. He was treated with antibx and protonix. He had repeat imaging 9/11 that showed considerable improvement in his inflammation with only a very small collection of air relatively confined. His last CT was 3/29/2021 with resolving inflammation in neha anterior right mid abdomen mesentery.  He denies any abdominal pain. He has stopped all NSAIDs. He is drinking about one beer a couple times a month. He is drinking about 1 caffeine drink a day. He is taking protonix 40mg daily without any breakthrough.  He denies any constipation after starting the lactulose. He has been trying to loss weight. Overall, he is feeling well. CS   10/6/2021 Mr. Rodriguez is here for f/u of enteritis and GERD. In August 2020, he had a CT scan that showed concentric wall thickening of small bowel with extensive inflammatory changes in the adjacent mesaenery and a  CTE that showed considerable inflammatory change with multiple areas of focal perforation with air in the mesentery. He had an EGD with a 1cm ulcer in the stomach and colonoscopy showed erythema with ulcers in the ascending colon. His last CT was 3/29/2021 with resolving inflammation in the anterior right mid abdomen mesentery.  He denies any abdominal pain. He has stopped all NSAIDs. He is taking protonix 20mg daily and using TUMS prn. He has indigestion depending on what he eats. He is having good BM with lactulose. He has lost some weight and his breathing has gotten better. His BS are also better controlled. Overall, he is feeling much better and hoping to continue losing weight. CS  4/13/2022 Mr. Rodriguez is here for f/u of GERD. In 2020,  he had an EGD with a 1cm ulcer in the stomach and colonoscopy showed erythema with ulcers in the ascending colon. This was thought to be related to Goody's/NSAIDs. His last CT was 3/29/2021 with resolving inflammation in the anterior right mid abdomen mesentery.  He denies any abdominal pain. He has stopped all NSAIDs.At his last OV, he was taking protonix 20mg daily and using TUMS prn and feeling well. In December, he got COVID and was hospitalized for 7 days in the ICU. He went home on 4 liters of O2. He now down to 2.5 liters. He is having worsening reflux and tums does not always cover his symptoms. His constipation is well controlled with lactulose. CS  10/25/2022 Mr. Rodriguez is here for f/u of GERD. In 2020,  he had an EGD with a 1cm ulcer in the stomach and colonoscopy showed erythema with ulcers in the ascending colon. This was thought to be related to Goody's/NSAIDs. His last CT was 3/29/2021 with resolving inflammation in the anterior right mid abdomen mesentery.  He denies any abdominal pain. He has stopped all NSAIDs. He continues to struggle with his reflux despite protonix 40mg BID and pepcid at bedtime. He often needs tums. he has been working out but gained weight. His constipation is well controlled with lactulose.  He continues to have to wear O2. CS

## 2023-05-04 ENCOUNTER — CLAIMS CREATED FROM THE CLAIM WINDOW (OUTPATIENT)
Dept: URBAN - METROPOLITAN AREA MEDICAL CENTER 1 | Facility: MEDICAL CENTER | Age: 53
End: 2023-05-04

## 2023-05-04 ENCOUNTER — CLAIMS CREATED FROM THE CLAIM WINDOW (OUTPATIENT)
Dept: URBAN - METROPOLITAN AREA MEDICAL CENTER 1 | Facility: MEDICAL CENTER | Age: 53
End: 2023-05-04
Payer: MEDICARE

## 2023-05-04 DIAGNOSIS — R10.30 ABDOMINAL PAIN OF UNKNOWN CAUSE: ICD-10-CM

## 2023-05-04 DIAGNOSIS — R10.13 ABDOMINAL DISCOMFORT, EPIGASTRIC: ICD-10-CM

## 2023-05-04 DIAGNOSIS — K92.1 ACUTE MELENA: ICD-10-CM

## 2023-05-04 PROCEDURE — G8427 DOCREV CUR MEDS BY ELIG CLIN: HCPCS

## 2023-05-04 PROCEDURE — 99222 1ST HOSP IP/OBS MODERATE 55: CPT

## 2023-05-05 ENCOUNTER — OUT OF OFFICE VISIT (OUTPATIENT)
Dept: URBAN - METROPOLITAN AREA MEDICAL CENTER 1 | Facility: MEDICAL CENTER | Age: 53
End: 2023-05-05

## 2023-05-05 ENCOUNTER — LAB OUTSIDE AN ENCOUNTER (OUTPATIENT)
Dept: URBAN - NONMETROPOLITAN AREA CLINIC 2 | Facility: CLINIC | Age: 53
End: 2023-05-05

## 2023-05-05 ENCOUNTER — CLAIMS CREATED FROM THE CLAIM WINDOW (OUTPATIENT)
Dept: URBAN - METROPOLITAN AREA MEDICAL CENTER 1 | Facility: MEDICAL CENTER | Age: 53
End: 2023-05-05
Payer: MEDICARE

## 2023-05-05 DIAGNOSIS — K29.60 ADENOPAPILLOMATOSIS GASTRICA: ICD-10-CM

## 2023-05-05 PROCEDURE — 43239 EGD BIOPSY SINGLE/MULTIPLE: CPT | Performed by: INTERNAL MEDICINE

## 2023-05-06 ENCOUNTER — CLAIMS CREATED FROM THE CLAIM WINDOW (OUTPATIENT)
Dept: URBAN - METROPOLITAN AREA MEDICAL CENTER 1 | Facility: MEDICAL CENTER | Age: 53
End: 2023-05-06
Payer: MEDICARE

## 2023-05-06 ENCOUNTER — CLAIMS CREATED FROM THE CLAIM WINDOW (OUTPATIENT)
Dept: URBAN - METROPOLITAN AREA MEDICAL CENTER 1 | Facility: MEDICAL CENTER | Age: 53
End: 2023-05-06

## 2023-05-06 DIAGNOSIS — R10.30 ABDOMINAL PAIN OF UNKNOWN CAUSE: ICD-10-CM

## 2023-05-06 DIAGNOSIS — R10.13 ABDOMINAL DISCOMFORT, EPIGASTRIC: ICD-10-CM

## 2023-05-06 PROCEDURE — 99232 SBSQ HOSP IP/OBS MODERATE 35: CPT | Performed by: NURSE PRACTITIONER

## 2023-05-08 ENCOUNTER — LAB OUTSIDE AN ENCOUNTER (OUTPATIENT)
Dept: URBAN - METROPOLITAN AREA CLINIC 35 | Facility: CLINIC | Age: 53
End: 2023-05-08

## 2023-05-08 ENCOUNTER — TELEPHONE ENCOUNTER (OUTPATIENT)
Dept: URBAN - METROPOLITAN AREA CLINIC 35 | Facility: CLINIC | Age: 53
End: 2023-05-08

## 2023-05-08 PROBLEM — 235709008: Status: ACTIVE | Noted: 2023-05-08

## 2023-05-08 LAB
AP CASE REPORT: (no result)
AP FINAL DIAGNOSIS: (no result)
AP GROSS DESCRIPTION: (no result)
AP MICROSCOPIC DESCRIPTION: (no result)
AP SPECIAL STAINS: (no result)

## 2023-05-08 RX ORDER — POLYETHYLENE GLYCOL 3350, SODIUM SULFATE, SODIUM CHLORIDE, POTASSIUM CHLORIDE, ASCORBIC ACID, SODIUM ASCORBATE 7.5-2.691G
1000ML KIT ORAL DAILY
Qty: 2000 ML | Refills: 0 | OUTPATIENT
Start: 2023-05-08 | End: 2023-05-10

## 2023-05-10 ENCOUNTER — TELEPHONE ENCOUNTER (OUTPATIENT)
Dept: URBAN - NONMETROPOLITAN AREA CLINIC 2 | Facility: CLINIC | Age: 53
End: 2023-05-10

## 2023-05-12 ENCOUNTER — TELEPHONE ENCOUNTER (OUTPATIENT)
Dept: URBAN - NONMETROPOLITAN AREA CLINIC 2 | Facility: CLINIC | Age: 53
End: 2023-05-12

## 2023-05-15 ENCOUNTER — ERX REFILL RESPONSE (OUTPATIENT)
Dept: URBAN - NONMETROPOLITAN AREA CLINIC 2 | Facility: CLINIC | Age: 53
End: 2023-05-15

## 2023-05-15 RX ORDER — LACTULOSE 10 G/15ML
45 ML SOLUTION ORAL TID
Qty: 4050 ML | Refills: 11 | OUTPATIENT

## 2023-05-15 RX ORDER — LACTULOSE 10 G/15ML
TAKE 45 ML BY MOUTH THREE TIMES A DAY SOLUTION ORAL; RECTAL
Qty: 4050 MILLILITER | Refills: 11 | OUTPATIENT

## 2023-06-01 ENCOUNTER — ERX REFILL RESPONSE (OUTPATIENT)
Dept: URBAN - NONMETROPOLITAN AREA CLINIC 2 | Facility: CLINIC | Age: 53
End: 2023-06-01

## 2023-06-01 RX ORDER — FAMOTIDINE 40 MG/1
1 TABLET AT BEDTIME AS NEEDED TABLET, FILM COATED ORAL ONCE A DAY
Qty: 90 TABLET | Refills: 3 | OUTPATIENT

## 2023-06-01 RX ORDER — FAMOTIDINE 40 MG/1
TAKE ONE TABLET BY MOUTH AT BEDTIME AS NEEDED TABLET, FILM COATED ORAL
Qty: 90 TABLET | Refills: 3 | OUTPATIENT

## 2023-06-13 ENCOUNTER — OFFICE VISIT (OUTPATIENT)
Dept: URBAN - NONMETROPOLITAN AREA CLINIC 13 | Facility: CLINIC | Age: 53
End: 2023-06-13
Payer: MEDICARE

## 2023-06-13 ENCOUNTER — WEB ENCOUNTER (OUTPATIENT)
Dept: URBAN - NONMETROPOLITAN AREA CLINIC 13 | Facility: CLINIC | Age: 53
End: 2023-06-13

## 2023-06-13 VITALS
HEART RATE: 111 BPM | DIASTOLIC BLOOD PRESSURE: 72 MMHG | TEMPERATURE: 98.1 F | WEIGHT: 315 LBS | BODY MASS INDEX: 42.66 KG/M2 | SYSTOLIC BLOOD PRESSURE: 126 MMHG | HEIGHT: 72 IN

## 2023-06-13 DIAGNOSIS — K25.9 GASTRIC ULCER DUE TO CHEMICAL: ICD-10-CM

## 2023-06-13 DIAGNOSIS — K59.04 CHRONIC IDIOPATHIC CONSTIPATION: ICD-10-CM

## 2023-06-13 DIAGNOSIS — R93.5 ABNORMAL FINDINGS ON DIAGNOSTIC IMAGING OF ABDOMEN: ICD-10-CM

## 2023-06-13 PROBLEM — 723105009: Status: ACTIVE | Noted: 2020-08-31

## 2023-06-13 PROCEDURE — 99214 OFFICE O/P EST MOD 30 MIN: CPT | Performed by: NURSE PRACTITIONER

## 2023-06-13 RX ORDER — ASPIRIN 81 MG/1
1 TABLET TABLET, COATED ORAL ONCE A DAY
Status: ACTIVE | COMMUNITY

## 2023-06-13 RX ORDER — LORAZEPAM 2 MG/1
1 TABLET AT BEDTIME AS NEEDED TABLET ORAL ONCE A DAY
Status: ACTIVE | COMMUNITY

## 2023-06-13 RX ORDER — FAMOTIDINE 40 MG/1
TAKE ONE TABLET BY MOUTH AT BEDTIME AS NEEDED TABLET, FILM COATED ORAL
Qty: 90 TABLET | Refills: 3 | Status: ACTIVE | COMMUNITY

## 2023-06-13 RX ORDER — METFORMIN HYDROCHLORIDE 1000 MG/1
1 TABLET WITH A MEAL TABLET, FILM COATED ORAL ONCE A DAY
Status: ACTIVE | COMMUNITY

## 2023-06-13 RX ORDER — FUROSEMIDE 40 MG/1
TAKE 1 TABLET (40 MG) BY ORAL ROUTE 2 TIMES PER DAY TABLET ORAL 2
Qty: 0 | Refills: 0 | Status: ACTIVE | COMMUNITY
Start: 1900-01-01

## 2023-06-13 RX ORDER — GABAPENTIN 100 MG/1
1 CAPSULE CAPSULE ORAL ONCE A DAY
Status: ACTIVE | COMMUNITY

## 2023-06-13 RX ORDER — CEFDINIR 300 MG/1
AS DIRECTED CAPSULE ORAL
Status: ACTIVE | COMMUNITY

## 2023-06-13 RX ORDER — INSULIN DETEMIR 100 [IU]/ML
INJECT BY SUBCUTANEOUS ROUTE PER PRESCRIBER'S INSTRUCTIONS. INSULIN DOSING REQUIRES INDIVIDUALIZATION INJECTION, SOLUTION SUBCUTANEOUS
Qty: 1 | Refills: 0 | Status: ACTIVE | COMMUNITY
Start: 1900-01-01

## 2023-06-13 RX ORDER — BRIMONIDINE TARTRATE 2 MG/ML
1 DROP INTO AFFECTED EYE SOLUTION/ DROPS OPHTHALMIC
Status: ACTIVE | COMMUNITY

## 2023-06-13 RX ORDER — CITALOPRAM 20 MG/1
TAKE 1 TABLET (20 MG) BY ORAL ROUTE ONCE DAILY TABLET ORAL 1
Qty: 0 | Refills: 0 | Status: ACTIVE | COMMUNITY
Start: 1900-01-01

## 2023-06-13 RX ORDER — HYDROCODONE BITARTRATE AND ACETAMINOPHEN 5; 325 MG/1; MG/1
1 TABLET AS NEEDED TABLET ORAL
Status: ACTIVE | COMMUNITY

## 2023-06-13 RX ORDER — PANTOPRAZOLE SODIUM 40 MG/1
1 TABLET TABLET, DELAYED RELEASE ORAL BID
Qty: 180 | Refills: 3

## 2023-06-13 RX ORDER — LISINOPRIL 20 MG/1
TAKE 1 TABLET (20 MG) BY ORAL ROUTE ONCE DAILY TABLET ORAL 1
Qty: 0 | Refills: 0 | Status: ACTIVE | COMMUNITY
Start: 1900-01-01

## 2023-06-13 RX ORDER — SUCRALFATE 1 G
1 TABLET DISSOLVED IN A TABLESPOON OF WATER ON AN EMPTY STOMACH TABLET ORAL TWICE A DAY
Qty: 180 TABLET | Refills: 2 | Status: ACTIVE | COMMUNITY
Start: 2022-10-25 | End: 2023-10-19

## 2023-06-13 RX ORDER — PANTOPRAZOLE SODIUM 40 MG/1
TAKE ONE TABLET BY MOUTH ONE TIME DAILY TABLET, DELAYED RELEASE ORAL
Qty: 90 TABLET | Refills: 4 | Status: ACTIVE | COMMUNITY

## 2023-06-13 RX ORDER — HYDROXYZINE HYDROCHLORIDE 25 MG/ML
2 ML AS NEEDED INJECTION, SOLUTION INTRAMUSCULAR
Status: ACTIVE | COMMUNITY

## 2023-06-13 RX ORDER — PANTOPRAZOLE SODIUM 40 MG/1
1 TABLET TABLET, DELAYED RELEASE ORAL BID
Qty: 180 | Refills: 3 | Status: ACTIVE | COMMUNITY

## 2023-06-13 RX ORDER — ATORVASTATIN CALCIUM 40 MG/1
1 TABLET TABLET, FILM COATED ORAL ONCE A DAY
Status: ACTIVE | COMMUNITY

## 2023-06-13 RX ORDER — FAMOTIDINE 40 MG/1
1 TABLET AT BEDTIME AS NEEDED TABLET, FILM COATED ORAL ONCE A DAY
Qty: 90 TABLET | Refills: 3 | OUTPATIENT

## 2023-06-13 RX ORDER — LACTULOSE 10 G/15ML
45 ML SOLUTION ORAL TID
Qty: 4050 ML | Refills: 11 | OUTPATIENT

## 2023-06-13 RX ORDER — MONTELUKAST SODIUM 10 MG/1
TAKE 1 TABLET (10 MG) BY ORAL ROUTE ONCE DAILY IN THE EVENING TABLET, FILM COATED ORAL 1
Qty: 0 | Refills: 0 | Status: ACTIVE | COMMUNITY
Start: 1900-01-01

## 2023-06-13 RX ORDER — SPIRONOLACTONE 50 MG/1
1 TABLET TABLET, FILM COATED ORAL ONCE A DAY
Status: ACTIVE | COMMUNITY

## 2023-06-13 RX ORDER — LACTULOSE 10 G/15ML
TAKE 45 ML BY MOUTH THREE TIMES A DAY SOLUTION ORAL; RECTAL
Qty: 4050 MILLILITER | Refills: 11 | Status: ACTIVE | COMMUNITY

## 2023-06-13 RX ORDER — HYDROCHLOROTHIAZIDE 25 MG/1
1 TABLET IN THE MORNING TABLET ORAL ONCE A DAY
Status: ACTIVE | COMMUNITY

## 2023-06-13 RX ORDER — INSULIN HUMAN 500 [IU]/ML
AS DIRECTED INJECTION, SOLUTION SUBCUTANEOUS
Status: ACTIVE | COMMUNITY

## 2023-06-13 RX ORDER — FAMOTIDINE 40 MG/1
1 TABLET AT BEDTIME AS NEEDED TABLET, FILM COATED ORAL ONCE A DAY
Qty: 90 TABLET | Refills: 3 | Status: ACTIVE | COMMUNITY

## 2023-06-13 NOTE — HPI-TODAY'S VISIT:
6/13/2023 Mr. Rodriguez is here for f/u of GERD. In 2020,  he had an EGD with a 1cm ulcer in the stomach and colonoscopy showed erythema with ulcers in the ascending colon. This was thought to be related to Goody's/NSAIDs. His last CT was 3/29/2021 with resolving inflammation in the anterior right mid abdomen mesentery.  In May, he started having severe abdominal pain and dark stools bringing him to [facility]. His EGD showed gastric polyps. Path chronic gastritis. His CT showed acute on chronic inflammatory changes of the RLQ centered about the terminal ileum suggestive of acute on chronic IBD. Concern of pelvic ascites loculated in the RLQ which may represent developing abscess or phlegmon. He was treated with antibiotics and feeling better today. His reflux is doing ok on protonix 40mg BID and pepcid at bedtime. He is taking ozempic and feeling full.   He wears NC O2. CS

## 2023-07-07 ENCOUNTER — LAB OUTSIDE AN ENCOUNTER (OUTPATIENT)
Dept: URBAN - NONMETROPOLITAN AREA CLINIC 2 | Facility: CLINIC | Age: 53
End: 2023-07-07

## 2023-07-07 ENCOUNTER — OFFICE VISIT (OUTPATIENT)
Dept: URBAN - METROPOLITAN AREA MEDICAL CENTER 1 | Facility: MEDICAL CENTER | Age: 53
End: 2023-07-07
Payer: MEDICARE

## 2023-07-07 DIAGNOSIS — Z12.11 COLON CANCER SCREENING: ICD-10-CM

## 2023-07-07 DIAGNOSIS — D12.5 ADENOMA OF SIGMOID COLON: ICD-10-CM

## 2023-07-07 DIAGNOSIS — D12.4 ADENOMA OF DESCENDING COLON: ICD-10-CM

## 2023-07-07 PROCEDURE — 45385 COLONOSCOPY W/LESION REMOVAL: CPT | Performed by: INTERNAL MEDICINE

## 2023-07-10 LAB
AP CASE REPORT: (no result)
AP FINAL DIAGNOSIS: (no result)
AP GROSS DESCRIPTION: (no result)
AP MICROSCOPIC DESCRIPTION: (no result)

## 2023-08-30 ENCOUNTER — ERX REFILL RESPONSE (OUTPATIENT)
Dept: URBAN - NONMETROPOLITAN AREA CLINIC 2 | Facility: CLINIC | Age: 53
End: 2023-08-30

## 2023-08-30 RX ORDER — FAMOTIDINE 40 MG/1
1 TABLET AT BEDTIME AS NEEDED TABLET, FILM COATED ORAL ONCE A DAY
Qty: 90 TABLET | Refills: 3 | OUTPATIENT

## 2023-08-30 RX ORDER — FAMOTIDINE 40 MG/1
TAKE ONE TABLET BY MOUTH AT BEDTIME AS NEEDED TABLET, FILM COATED ORAL
Qty: 90 TABLET | Refills: 3 | OUTPATIENT

## 2023-08-31 ENCOUNTER — OFFICE VISIT (OUTPATIENT)
Dept: URBAN - NONMETROPOLITAN AREA CLINIC 13 | Facility: CLINIC | Age: 53
End: 2023-08-31

## 2023-10-16 ENCOUNTER — OFFICE VISIT (OUTPATIENT)
Dept: URBAN - NONMETROPOLITAN AREA CLINIC 13 | Facility: CLINIC | Age: 53
End: 2023-10-16

## 2023-11-20 ENCOUNTER — TELEPHONE ENCOUNTER (OUTPATIENT)
Dept: URBAN - NONMETROPOLITAN AREA CLINIC 13 | Facility: CLINIC | Age: 53
End: 2023-11-20

## 2023-11-20 RX ORDER — LORAZEPAM 2 MG/1
1 TABLET AT BEDTIME AS NEEDED TABLET ORAL ONCE A DAY
OUTPATIENT

## 2023-11-20 RX ORDER — ATORVASTATIN CALCIUM 40 MG/1
1 TABLET TABLET, FILM COATED ORAL ONCE A DAY
OUTPATIENT

## 2023-11-20 RX ORDER — PANTOPRAZOLE SODIUM 40 MG/1
TAKE ONE TABLET BY MOUTH ONE TIME DAILY TABLET, DELAYED RELEASE ORAL
OUTPATIENT

## 2023-11-20 RX ORDER — CITALOPRAM 20 MG/1
TAKE 1 TABLET (20 MG) BY ORAL ROUTE ONCE DAILY TABLET ORAL 1
OUTPATIENT

## 2023-11-20 RX ORDER — LACTULOSE 10 G/15ML
45 ML SOLUTION ORAL TID
OUTPATIENT

## 2023-11-20 RX ORDER — FAMOTIDINE 40 MG/1
TAKE ONE TABLET BY MOUTH AT BEDTIME AS NEEDED TABLET, FILM COATED ORAL
OUTPATIENT

## 2023-11-20 RX ORDER — BRIMONIDINE TARTRATE 2 MG/ML
1 DROP INTO AFFECTED EYE SOLUTION/ DROPS OPHTHALMIC
OUTPATIENT

## 2023-11-20 RX ORDER — HYDROCODONE BITARTRATE AND ACETAMINOPHEN 5; 325 MG/1; MG/1
1 TABLET AS NEEDED TABLET ORAL
OUTPATIENT

## 2023-11-20 RX ORDER — HYDROCHLOROTHIAZIDE 25 MG/1
1 TABLET IN THE MORNING TABLET ORAL ONCE A DAY
OUTPATIENT

## 2023-11-20 RX ORDER — SPIRONOLACTONE 50 MG/1
1 TABLET TABLET, FILM COATED ORAL ONCE A DAY
OUTPATIENT

## 2023-11-20 RX ORDER — HYDROXYZINE HYDROCHLORIDE 25 MG/ML
2 ML AS NEEDED INJECTION, SOLUTION INTRAMUSCULAR
OUTPATIENT

## 2023-11-20 RX ORDER — MONTELUKAST SODIUM 10 MG/1
TAKE 1 TABLET (10 MG) BY ORAL ROUTE ONCE DAILY IN THE EVENING TABLET, FILM COATED ORAL 1
OUTPATIENT

## 2023-11-20 RX ORDER — METFORMIN HYDROCHLORIDE 1000 MG/1
1 TABLET WITH A MEAL TABLET, FILM COATED ORAL ONCE A DAY
OUTPATIENT

## 2023-11-20 RX ORDER — INSULIN DETEMIR 100 [IU]/ML
INJECT BY SUBCUTANEOUS ROUTE PER PRESCRIBER'S INSTRUCTIONS. INSULIN DOSING REQUIRES INDIVIDUALIZATION INJECTION, SOLUTION SUBCUTANEOUS
OUTPATIENT

## 2023-11-20 RX ORDER — INSULIN HUMAN 500 [IU]/ML
AS DIRECTED INJECTION, SOLUTION SUBCUTANEOUS
OUTPATIENT

## 2023-11-20 RX ORDER — PANTOPRAZOLE SODIUM 40 MG/1
1 TABLET TABLET, DELAYED RELEASE ORAL BID
OUTPATIENT

## 2023-11-20 RX ORDER — CEFDINIR 300 MG/1
AS DIRECTED CAPSULE ORAL
OUTPATIENT

## 2023-11-20 RX ORDER — GABAPENTIN 100 MG/1
1 CAPSULE CAPSULE ORAL ONCE A DAY
OUTPATIENT

## 2023-11-20 RX ORDER — FAMOTIDINE 40 MG/1
1 TABLET AT BEDTIME AS NEEDED TABLET, FILM COATED ORAL ONCE A DAY
OUTPATIENT

## 2023-11-20 RX ORDER — LACTULOSE 10 G/15ML
TAKE 45 ML BY MOUTH THREE TIMES A DAY SOLUTION ORAL; RECTAL
OUTPATIENT

## 2023-11-20 RX ORDER — ASPIRIN 81 MG/1
1 TABLET TABLET, COATED ORAL ONCE A DAY
OUTPATIENT

## 2023-11-20 RX ORDER — LISINOPRIL 20 MG/1
TAKE 1 TABLET (20 MG) BY ORAL ROUTE ONCE DAILY TABLET ORAL 1
OUTPATIENT

## 2023-11-20 RX ORDER — SUCRALFATE 1 G
1 TABLET DISSOLVED IN A TABLESPOON OF WATER ON AN EMPTY STOMACH TABLET ORAL TWICE A DAY
Qty: 180 TABLET | Refills: 2
Start: 2022-10-25 | End: 2024-08-16

## 2023-11-20 RX ORDER — FUROSEMIDE 40 MG/1
TAKE 1 TABLET (40 MG) BY ORAL ROUTE 2 TIMES PER DAY TABLET ORAL 2
OUTPATIENT

## 2023-12-04 ENCOUNTER — OFFICE VISIT (OUTPATIENT)
Dept: URBAN - NONMETROPOLITAN AREA CLINIC 13 | Facility: CLINIC | Age: 53
End: 2023-12-04
Payer: MEDICARE

## 2023-12-04 ENCOUNTER — DASHBOARD ENCOUNTERS (OUTPATIENT)
Age: 53
End: 2023-12-04

## 2023-12-04 VITALS
DIASTOLIC BLOOD PRESSURE: 71 MMHG | HEIGHT: 72 IN | BODY MASS INDEX: 42.66 KG/M2 | HEART RATE: 124 BPM | SYSTOLIC BLOOD PRESSURE: 120 MMHG | WEIGHT: 315 LBS

## 2023-12-04 DIAGNOSIS — K59.04 CHRONIC IDIOPATHIC CONSTIPATION: ICD-10-CM

## 2023-12-04 DIAGNOSIS — R93.5 ABNORMAL FINDINGS ON DIAGNOSTIC IMAGING OF ABDOMEN: ICD-10-CM

## 2023-12-04 DIAGNOSIS — K25.9 GASTRIC ULCER DUE TO CHEMICAL: ICD-10-CM

## 2023-12-04 PROBLEM — 441988000: Status: ACTIVE | Noted: 2020-08-31

## 2023-12-04 PROCEDURE — 99213 OFFICE O/P EST LOW 20 MIN: CPT | Performed by: NURSE PRACTITIONER

## 2023-12-04 RX ORDER — LACTULOSE 10 G/15ML
45 ML SOLUTION ORAL TID
Status: ACTIVE | COMMUNITY

## 2023-12-04 RX ORDER — ASPIRIN 81 MG/1
1 TABLET TABLET, COATED ORAL ONCE A DAY
Status: ACTIVE | COMMUNITY

## 2023-12-04 RX ORDER — METFORMIN HYDROCHLORIDE 1000 MG/1
1 TABLET WITH A MEAL TABLET, FILM COATED ORAL ONCE A DAY
Status: ACTIVE | COMMUNITY

## 2023-12-04 RX ORDER — FUROSEMIDE 40 MG/1
TAKE 1 TABLET (40 MG) BY ORAL ROUTE 2 TIMES PER DAY TABLET ORAL 2
Status: ACTIVE | COMMUNITY

## 2023-12-04 RX ORDER — SUCRALFATE 1 G
1 TABLET DISSOLVED IN A TABLESPOON OF WATER ON AN EMPTY STOMACH TABLET ORAL TWICE A DAY
Qty: 180 TABLET | Refills: 2 | Status: ACTIVE | COMMUNITY
Start: 2022-10-25 | End: 2024-08-16

## 2023-12-04 RX ORDER — LORAZEPAM 2 MG/1
1 TABLET AT BEDTIME AS NEEDED TABLET ORAL ONCE A DAY
Status: ACTIVE | COMMUNITY

## 2023-12-04 RX ORDER — PANTOPRAZOLE SODIUM 40 MG/1
1 TABLET TABLET, DELAYED RELEASE ORAL TWICE A DAY
Qty: 180 TABLET | Refills: 3

## 2023-12-04 RX ORDER — HYDROXYZINE HYDROCHLORIDE 25 MG/ML
2 ML AS NEEDED INJECTION, SOLUTION INTRAMUSCULAR
Status: ACTIVE | COMMUNITY

## 2023-12-04 RX ORDER — LACTULOSE 10 G/15ML
TAKE 45 ML BY MOUTH THREE TIMES A DAY SOLUTION ORAL; RECTAL
Status: ACTIVE | COMMUNITY

## 2023-12-04 RX ORDER — ATORVASTATIN CALCIUM 40 MG/1
1 TABLET TABLET, FILM COATED ORAL ONCE A DAY
Status: ACTIVE | COMMUNITY

## 2023-12-04 RX ORDER — LISINOPRIL 20 MG/1
TAKE 1 TABLET (20 MG) BY ORAL ROUTE ONCE DAILY TABLET ORAL 1
Status: ACTIVE | COMMUNITY

## 2023-12-04 RX ORDER — CEFDINIR 300 MG/1
AS DIRECTED CAPSULE ORAL
Status: ACTIVE | COMMUNITY

## 2023-12-04 RX ORDER — HYDROCHLOROTHIAZIDE 25 MG/1
1 TABLET IN THE MORNING TABLET ORAL ONCE A DAY
Status: ACTIVE | COMMUNITY

## 2023-12-04 RX ORDER — MONTELUKAST SODIUM 10 MG/1
TAKE 1 TABLET (10 MG) BY ORAL ROUTE ONCE DAILY IN THE EVENING TABLET, FILM COATED ORAL 1
Status: ACTIVE | COMMUNITY

## 2023-12-04 RX ORDER — FAMOTIDINE 40 MG/1
1 TABLET AT BEDTIME AS NEEDED TABLET, FILM COATED ORAL ONCE A DAY
Qty: 90 TABLET | Refills: 3 | OUTPATIENT

## 2023-12-04 RX ORDER — HYDROCODONE BITARTRATE AND ACETAMINOPHEN 5; 325 MG/1; MG/1
1 TABLET AS NEEDED TABLET ORAL
Status: ACTIVE | COMMUNITY

## 2023-12-04 RX ORDER — INSULIN DETEMIR 100 [IU]/ML
INJECT BY SUBCUTANEOUS ROUTE PER PRESCRIBER'S INSTRUCTIONS. INSULIN DOSING REQUIRES INDIVIDUALIZATION INJECTION, SOLUTION SUBCUTANEOUS
Status: ACTIVE | COMMUNITY

## 2023-12-04 RX ORDER — FAMOTIDINE 40 MG/1
TAKE ONE TABLET BY MOUTH AT BEDTIME AS NEEDED TABLET, FILM COATED ORAL
Status: ACTIVE | COMMUNITY

## 2023-12-04 RX ORDER — INSULIN HUMAN 500 [IU]/ML
AS DIRECTED INJECTION, SOLUTION SUBCUTANEOUS
Status: ACTIVE | COMMUNITY

## 2023-12-04 RX ORDER — PANTOPRAZOLE SODIUM 40 MG/1
TAKE ONE TABLET BY MOUTH ONE TIME DAILY TABLET, DELAYED RELEASE ORAL
Status: ACTIVE | COMMUNITY

## 2023-12-04 RX ORDER — GABAPENTIN 100 MG/1
1 CAPSULE CAPSULE ORAL ONCE A DAY
Status: ACTIVE | COMMUNITY

## 2023-12-04 RX ORDER — FAMOTIDINE 40 MG/1
1 TABLET AT BEDTIME AS NEEDED TABLET, FILM COATED ORAL ONCE A DAY
Status: ACTIVE | COMMUNITY

## 2023-12-04 RX ORDER — SPIRONOLACTONE 50 MG/1
1 TABLET TABLET, FILM COATED ORAL ONCE A DAY
Status: ACTIVE | COMMUNITY

## 2023-12-04 RX ORDER — BRIMONIDINE TARTRATE 2 MG/ML
1 DROP INTO AFFECTED EYE SOLUTION/ DROPS OPHTHALMIC
Status: ACTIVE | COMMUNITY

## 2023-12-04 RX ORDER — PANTOPRAZOLE SODIUM 40 MG/1
1 TABLET TABLET, DELAYED RELEASE ORAL BID
Status: ACTIVE | COMMUNITY

## 2023-12-04 RX ORDER — LACTULOSE 10 G/15ML
45 ML SOLUTION ORAL THREE TIMES A DAY
Qty: 4050 ML | Refills: 11 | OUTPATIENT

## 2023-12-04 RX ORDER — CITALOPRAM 20 MG/1
TAKE 1 TABLET (20 MG) BY ORAL ROUTE ONCE DAILY TABLET ORAL 1
Status: ACTIVE | COMMUNITY

## 2023-12-04 NOTE — HPI-OTHER HISTORIES
2/23/2017  Mr. Juanito Rodriguez is here for f/u of abnormal imaging of his liver and spleen. He has COPD and sleep apnea. He has LE edema. He is on 3 liters of O2. His chronic liver work up is normal and his liver enzymes are normal. He has stopped drinking alcohol. His weight is down 8 pounds.  He has been complaining of chest pain for the last 2 months. The pain is mid chest and then radiated down both sides of his abdomen. The pain has no triggers. The pain will resolve on its own. He has heartburn which improves with TUMs. He has SOB after eating. He was started on omeprazole OTC. Since starting this, his chest pain is slightly better but continues.  He was having diarrhea 3-4 times a day. This had been going on the last 2 months. His metformin was increased to 2 a day about that same time. After his OV, his diarrhea resolved. He did not complete the stool studies. He is taking Align.  CS   8/31/2020 Mr. Rodriguez is here for hospital f/u. He started having a lot of abdominal pain and feeling weak bringing him to the ER for further evaluation. He had a CT scan that showed concentric wall thickening of small bowel with extensive inflammatory changes in the adjacent mesaenery. CTE 3 days later showed considerable inflammatory change with multiple areas of focal perforation with air in neha mesentery. He had an EGD with a 1cm ulcer in the stomach and colonoscopy showed erythema with ulcers in the ascending colon. Two hyperplastic were removed. Path returned with normal colon tissue, no hpylori, and chronic mild inflammation in the small bowel. He has been on antibx and still has a few days left. He admits to take 4 Goody powders a day along with alleve. He is on protonix with some mild improvement. The pain is worse after eating. He has chronic constipation. He is on lactulose which is helping. He denies any blood in his stool or melena. CS   10/7/2020 Mr. Rodriguez is here for f/u of enteritis. In August, he had a CT scan that showed concentric wall thickening of small bowel with extensive inflammatory changes in the adjacent mesaenery and a  CTE that showed considerable inflammatory change with multiple areas of focal perforation with air in the mesentery. He had an EGD with a 1cm ulcer in the stomach and colonoscopy showed erythema with ulcers in the ascending colon. Two hyperplastic were removed. Path returned with normal colon tissue, no hpylori, and chronic mild inflammation in the small bowel. He was treated with antibx and protonix. He admited to take 4 Goody powders a day along with alleve. He was advised to continue protonix and stop all NSAIDs. He has repeat imaging 9/11 that showed considerable improvement in his inflammation with only a very small collection of air relatively confined. He denies any abdominal pain. He denies any constipation after starting the lactulose. From a GI standpoint, he feels well.  He has been having issues with his kidneys and lungs. He was recently in the \[facility]\"" for a week. CS   4/7/2021 Mr. Rodriguez is here for f/u of enteritis. In August, he had a CT scan that showed concentric wall thickening of small bowel with extensive inflammatory changes in the adjacent mesaenery and a  CTE that showed considerable inflammatory change with multiple areas of focal perforation with air in the mesentery. He had an EGD with a 1cm ulcer in the stomach and colonoscopy showed erythema with ulcers in the ascending colon. Two hyperplastic were removed. Path returned with normal colon tissue, no hpylori, and chronic mild inflammation in the small bowel. He was treated with antibx and protonix. He had repeat imaging 9/11 that showed considerable improvement in his inflammation with only a very small collection of air relatively confined. His last CT was 3/29/2021 with resolving inflammation in neha anterior right mid abdomen mesentery.  He denies any abdominal pain. He has stopped all NSAIDs. He is drinking about one beer a couple times a month. He is drinking about 1 caffeine drink a day. He is taking protonix 40mg daily without any breakthrough.  He denies any constipation after starting the lactulose. He has been trying to loss weight. Overall, he is feeling well. CS   10/6/2021 Mr. Rodriguez is here for f/u of enteritis and GERD. In August 2020, he had a CT scan that showed concentric wall thickening of small bowel with extensive inflammatory changes in the adjacent mesaenery and a  CTE that showed considerable inflammatory change with multiple areas of focal perforation with air in the mesentery. He had an EGD with a 1cm ulcer in the stomach and colonoscopy showed erythema with ulcers in the ascending colon. His last CT was 3/29/2021 with resolving inflammation in the anterior right mid abdomen mesentery.  He denies any abdominal pain. He has stopped all NSAIDs. He is taking protonix 20mg daily and using TUMS prn. He has indigestion depending on what he eats. He is having good BM with lactulose. He has lost some weight and his breathing has gotten better. His BS are also better controlled. Overall, he is feeling much better and hoping to continue losing weight. CS  4/13/2022 Mr. Rodriguez is here for f/u of GERD. In 2020,  he had an EGD with a 1cm ulcer in the stomach and colonoscopy showed erythema with ulcers in the ascending colon. This was thought to be related to Goody's/NSAIDs. His last CT was 3/29/2021 with resolving inflammation in the anterior right mid abdomen mesentery.  He denies any abdominal pain. He has stopped all NSAIDs.At his last OV, he was taking protonix 20mg daily and using TUMS prn and feeling well. In December, he got COVID and was hospitalized for 7 days in the ICU. He went home on 4 liters of O2. He now down to 2.5 liters. He is having worsening reflux and tums does not always cover his symptoms. His constipation is well controlled with lactulose. CS  10/25/2022 Mr. Rodriguez is here for f/u of GERD. In 2020,  he had an EGD with a 1cm ulcer in the stomach and colonoscopy showed erythema with ulcers in the ascending colon. This was thought to be related to Goody's/NSAIDs. His last CT was 3/29/2021 with resolving inflammation in the anterior right mid abdomen mesentery.  He denies any abdominal pain. He has stopped all NSAIDs. He continues to struggle with his reflux despite protonix 40mg BID and pepcid at bedtime. He often needs tums. he has been working out but gained weight. His constipation is well controlled with lactulose.  He continues to have to wear O2. CS  4/25/2023 Mr. Rodriguez is here for f/u of GERD. In 2020,  he had an EGD with a 1cm ulcer in the stomach and colonoscopy showed erythema with ulcers in the ascending colon. This was thought to be related to Goody's/NSAIDs. His last CT was 3/29/2021 with resolving inflammation in the anterior right mid abdomen mesentery.  At his last, continued to have protonix 40mg BID and pepcid at bedtime so carafate was added. Today, his reflux is better but he continues to have abdominal pain. he has been taking ibuprofen.  He wears NC O2. CS  6/13/2023 Mr. Rodriguez is here for f/u of GERD. In 2020, he had an EGD with a 1cm ulcer in the stomach and colonoscopy showed erythema with ulcers in the ascending colon. This was thought to be related to Goody's/NSAIDs. His last CT was 3/29/2021 with resolving inflammation in the anterior right mid abdomen mesentery. In May, he started having severe abdominal pain and dark stools bringing him to [facility]. His EGD showed gastric polyps. Path chronic gastritis. His CT showed acute on chronic inflammatory changes of the RLQ centered about the terminal ileum suggestive of acute on chronic IBD. Concern of pelvic ascites loculated in the RLQ which may represent developing abscess or phlegmon. He was treated with antibiotics and feeling better today. His reflux is doing ok on protonix 40mg BID and pepcid at bedtime. He is taking ozempic and feeling full. He wears NC O2. CS 7/7/2023 Colonoscopy: TA polyp
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
## 2024-05-07 ENCOUNTER — TELEPHONE ENCOUNTER (OUTPATIENT)
Dept: URBAN - NONMETROPOLITAN AREA CLINIC 2 | Facility: CLINIC | Age: 54
End: 2024-05-07

## 2024-05-07 RX ORDER — SUCRALFATE 1 G/1
1 TABLET DISSOLVED IN A TABLESPOON OF WATER ON AN EMPTY STOMACH TABLET ORAL
Qty: 120 TABLET | Refills: 3 | OUTPATIENT
Start: 2024-05-07 | End: 2024-09-04

## 2024-05-28 ENCOUNTER — ERX REFILL RESPONSE (OUTPATIENT)
Dept: URBAN - NONMETROPOLITAN AREA CLINIC 2 | Facility: CLINIC | Age: 54
End: 2024-05-28

## 2024-05-28 RX ORDER — LACTULOSE 10 G/15ML
TAKE 45 ML BY MOUTH THREE TIMES A DAY SOLUTION ORAL; RECTAL
Qty: 4050 MILLILITER | Refills: 12 | OUTPATIENT

## 2024-05-28 RX ORDER — LACTULOSE 10 G/15ML
TAKE 45 ML BY MOUTH THREE TIMES A DAY SOLUTION ORAL; RECTAL
Qty: 4050 MILLILITER | Refills: 11 | OUTPATIENT

## 2024-06-04 ENCOUNTER — OFFICE VISIT (OUTPATIENT)
Dept: URBAN - NONMETROPOLITAN AREA CLINIC 13 | Facility: CLINIC | Age: 54
End: 2024-06-04
Payer: MEDICARE

## 2024-06-04 VITALS
BODY MASS INDEX: 40.74 KG/M2 | WEIGHT: 300.8 LBS | DIASTOLIC BLOOD PRESSURE: 72 MMHG | HEIGHT: 72 IN | SYSTOLIC BLOOD PRESSURE: 112 MMHG | HEART RATE: 88 BPM

## 2024-06-04 DIAGNOSIS — K25.9 GASTRIC ULCER DUE TO CHEMICAL: ICD-10-CM

## 2024-06-04 DIAGNOSIS — R93.5 ABNORMAL FINDINGS ON DIAGNOSTIC IMAGING OF ABDOMEN: ICD-10-CM

## 2024-06-04 DIAGNOSIS — K59.04 CHRONIC IDIOPATHIC CONSTIPATION: ICD-10-CM

## 2024-06-04 PROCEDURE — 99214 OFFICE O/P EST MOD 30 MIN: CPT | Performed by: NURSE PRACTITIONER

## 2024-06-04 RX ORDER — PANTOPRAZOLE SODIUM 40 MG/1
1 TABLET TABLET, DELAYED RELEASE ORAL TWICE A DAY
Qty: 180 TABLET | Refills: 3

## 2024-06-04 RX ORDER — CEFDINIR 300 MG/1
AS DIRECTED CAPSULE ORAL
Status: ACTIVE | COMMUNITY

## 2024-06-04 RX ORDER — SUCRALFATE 1 G/1
1 TABLET DISSOLVED IN A TABLESPOON OF WATER ON AN EMPTY STOMACH TABLET ORAL
Qty: 120 TABLET | Refills: 3 | Status: ACTIVE | COMMUNITY
Start: 2024-05-07 | End: 2024-09-04

## 2024-06-04 RX ORDER — FAMOTIDINE 40 MG/1
1 TABLET AT BEDTIME AS NEEDED TABLET, FILM COATED ORAL ONCE A DAY
Status: ACTIVE | COMMUNITY

## 2024-06-04 RX ORDER — FAMOTIDINE 40 MG/1
TAKE ONE TABLET BY MOUTH AT BEDTIME AS NEEDED TABLET, FILM COATED ORAL
Status: ACTIVE | COMMUNITY

## 2024-06-04 RX ORDER — INSULIN HUMAN 500 [IU]/ML
AS DIRECTED INJECTION, SOLUTION SUBCUTANEOUS
Status: ACTIVE | COMMUNITY

## 2024-06-04 RX ORDER — HYDROCHLOROTHIAZIDE 25 MG/1
1 TABLET IN THE MORNING TABLET ORAL ONCE A DAY
Status: ACTIVE | COMMUNITY

## 2024-06-04 RX ORDER — PANTOPRAZOLE SODIUM 40 MG/1
TAKE ONE TABLET BY MOUTH ONE TIME DAILY TABLET, DELAYED RELEASE ORAL
Status: ACTIVE | COMMUNITY

## 2024-06-04 RX ORDER — ASPIRIN 81 MG/1
1 TABLET TABLET, COATED ORAL ONCE A DAY
Status: ACTIVE | COMMUNITY

## 2024-06-04 RX ORDER — ATORVASTATIN CALCIUM 40 MG/1
1 TABLET TABLET, FILM COATED ORAL ONCE A DAY
Status: ACTIVE | COMMUNITY

## 2024-06-04 RX ORDER — INSULIN DETEMIR 100 [IU]/ML
INJECT BY SUBCUTANEOUS ROUTE PER PRESCRIBER'S INSTRUCTIONS. INSULIN DOSING REQUIRES INDIVIDUALIZATION INJECTION, SOLUTION SUBCUTANEOUS
Status: ACTIVE | COMMUNITY

## 2024-06-04 RX ORDER — BRIMONIDINE TARTRATE 2 MG/MG
1 DROP INTO AFFECTED EYE SOLUTION/ DROPS OPHTHALMIC
Status: ACTIVE | COMMUNITY

## 2024-06-04 RX ORDER — PANTOPRAZOLE SODIUM 40 MG/1
1 TABLET TABLET, DELAYED RELEASE ORAL TWICE A DAY
Qty: 180 TABLET | Refills: 3 | Status: ACTIVE | COMMUNITY

## 2024-06-04 RX ORDER — METFORMIN HYDROCHLORIDE 1000 MG/1
1 TABLET WITH A MEAL TABLET, FILM COATED ORAL ONCE A DAY
Status: ACTIVE | COMMUNITY

## 2024-06-04 RX ORDER — MONTELUKAST SODIUM 10 MG/1
TAKE 1 TABLET (10 MG) BY ORAL ROUTE ONCE DAILY IN THE EVENING TABLET, FILM COATED ORAL 1
Status: ACTIVE | COMMUNITY

## 2024-06-04 RX ORDER — LORAZEPAM 2 MG/1
1 TABLET AT BEDTIME AS NEEDED TABLET ORAL ONCE A DAY
Status: ACTIVE | COMMUNITY

## 2024-06-04 RX ORDER — HYDROCODONE BITARTRATE AND ACETAMINOPHEN 5; 325 MG/1; MG/1
1 TABLET AS NEEDED TABLET ORAL
Status: ACTIVE | COMMUNITY

## 2024-06-04 RX ORDER — LACTULOSE 10 G/15ML
45 ML SOLUTION ORAL THREE TIMES A DAY
Qty: 4050 ML | Refills: 11 | Status: ACTIVE | COMMUNITY

## 2024-06-04 RX ORDER — CITALOPRAM 20 MG/1
TAKE 1 TABLET (20 MG) BY ORAL ROUTE ONCE DAILY TABLET ORAL 1
Status: ACTIVE | COMMUNITY

## 2024-06-04 RX ORDER — LACTULOSE 10 G/15ML
45 ML SOLUTION ORAL THREE TIMES A DAY
Qty: 4050 ML | Refills: 11 | OUTPATIENT

## 2024-06-04 RX ORDER — LACTULOSE 10 G/15ML
TAKE 45 ML BY MOUTH THREE TIMES A DAY SOLUTION ORAL; RECTAL
Qty: 4050 MILLILITER | Refills: 11 | Status: ACTIVE | COMMUNITY

## 2024-06-04 RX ORDER — SUCRALFATE 1 G/1
1 TABLET DISSOLVED IN A TABLESPOON OF WATER ON AN EMPTY STOMACH TABLET ORAL TWICE A DAY
Qty: 180 TABLET | Refills: 2 | Status: ACTIVE | COMMUNITY
Start: 2022-10-25 | End: 2024-08-16

## 2024-06-04 RX ORDER — HYDROXYZINE HYDROCHLORIDE 25 MG/ML
2 ML AS NEEDED INJECTION, SOLUTION INTRAMUSCULAR
Status: ACTIVE | COMMUNITY

## 2024-06-04 RX ORDER — SPIRONOLACTONE 50 MG/1
1 TABLET TABLET, FILM COATED ORAL ONCE A DAY
Status: ACTIVE | COMMUNITY

## 2024-06-04 RX ORDER — FUROSEMIDE 40 MG/1
TAKE 1 TABLET (40 MG) BY ORAL ROUTE 2 TIMES PER DAY TABLET ORAL 2
Status: ACTIVE | COMMUNITY

## 2024-06-04 RX ORDER — FAMOTIDINE 40 MG/1
1 TABLET AT BEDTIME AS NEEDED TABLET, FILM COATED ORAL ONCE A DAY
Qty: 90 TABLET | Refills: 3 | Status: ACTIVE | COMMUNITY

## 2024-06-04 RX ORDER — FAMOTIDINE 40 MG/1
1 TABLET AT BEDTIME AS NEEDED TABLET, FILM COATED ORAL ONCE A DAY
Qty: 90 TABLET | Refills: 3 | OUTPATIENT

## 2024-06-04 RX ORDER — GABAPENTIN 100 MG/1
1 CAPSULE CAPSULE ORAL ONCE A DAY
Status: ACTIVE | COMMUNITY

## 2024-06-04 RX ORDER — LISINOPRIL 20 MG/1
TAKE 1 TABLET (20 MG) BY ORAL ROUTE ONCE DAILY TABLET ORAL 1
Status: ACTIVE | COMMUNITY

## 2024-06-04 NOTE — HPI-TODAY'S VISIT:
6/4/2024 Mr. Rodriguez is here for f/u of GERD and abnormal imaging. In 2020,  he had an EGD with a 1cm ulcer in the stomach and colonoscopy showed erythema with ulcers in the ascending colon. This was thought to be related to Goody's/NSAIDs.  He has done well on protonix, pepcid, carafate, and lactulose. He did have a flare up of stomach pain after taking ibuprofen and Goodys. He was having severe stomach pain. He stopped this and his symptoms slowly improved. He denies any stomach pain today. He is having normal stools for a few days and then diarrhea despite lactulose 4 times a day. CS
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
## 2024-08-12 ENCOUNTER — ERX REFILL RESPONSE (OUTPATIENT)
Dept: URBAN - NONMETROPOLITAN AREA CLINIC 2 | Facility: CLINIC | Age: 54
End: 2024-08-12

## 2024-08-12 RX ORDER — FAMOTIDINE 40 MG/1
TAKE ONE TABLET BY MOUTH AT BEDTIME AS NEEDED TABLET, FILM COATED ORAL
OUTPATIENT

## 2024-08-12 RX ORDER — FAMOTIDINE 40 MG/1
1 TABLET AT BEDTIME TABLET, FILM COATED ORAL ONCE A DAY
Qty: 90 TABLET | Refills: 3 | OUTPATIENT

## 2024-09-10 ENCOUNTER — ERX REFILL RESPONSE (OUTPATIENT)
Dept: URBAN - NONMETROPOLITAN AREA CLINIC 2 | Facility: CLINIC | Age: 54
End: 2024-09-10

## 2024-09-10 RX ORDER — SUCRALFATE 1 G/1
DISSOLVE ONE TABLET IN A TABLESPOON OF WATER AND TAKE BY MOUTH ON AN EMPTY STOMACH FOUR TIMES A DAY TABLET ORAL
Qty: 120 TABLET | Refills: 4 | OUTPATIENT

## 2024-09-10 RX ORDER — SUCRALFATE 1 G/1
1 TABLET ON AN EMPTY STOMACH TABLET ORAL
Qty: 120 TABLET | Refills: 11 | OUTPATIENT

## 2024-12-03 ENCOUNTER — OFFICE VISIT (OUTPATIENT)
Dept: URBAN - NONMETROPOLITAN AREA CLINIC 13 | Facility: CLINIC | Age: 54
End: 2024-12-03
Payer: MEDICARE

## 2024-12-03 VITALS
HEIGHT: 72 IN | BODY MASS INDEX: 39.28 KG/M2 | DIASTOLIC BLOOD PRESSURE: 62 MMHG | WEIGHT: 290 LBS | SYSTOLIC BLOOD PRESSURE: 92 MMHG | HEART RATE: 91 BPM

## 2024-12-03 DIAGNOSIS — K25.9 GASTRIC ULCER DUE TO CHEMICAL: ICD-10-CM

## 2024-12-03 DIAGNOSIS — R93.5 ABNORMAL FINDINGS ON DIAGNOSTIC IMAGING OF ABDOMEN: ICD-10-CM

## 2024-12-03 DIAGNOSIS — K59.04 CHRONIC IDIOPATHIC CONSTIPATION: ICD-10-CM

## 2024-12-03 PROCEDURE — 99213 OFFICE O/P EST LOW 20 MIN: CPT | Performed by: NURSE PRACTITIONER

## 2024-12-03 RX ORDER — FAMOTIDINE 40 MG/1
1 TABLET AT BEDTIME TABLET, FILM COATED ORAL ONCE A DAY
Qty: 90 TABLET | Refills: 3 | Status: ACTIVE | COMMUNITY

## 2024-12-03 RX ORDER — CITALOPRAM 20 MG/1
TAKE 1 TABLET (20 MG) BY ORAL ROUTE ONCE DAILY TABLET ORAL 1
Status: ACTIVE | COMMUNITY

## 2024-12-03 RX ORDER — FAMOTIDINE 40 MG/1
1 TABLET AT BEDTIME AS NEEDED TABLET, FILM COATED ORAL ONCE A DAY
Qty: 90 TABLET | Refills: 3 | Status: ACTIVE | COMMUNITY

## 2024-12-03 RX ORDER — GABAPENTIN 100 MG/1
1 CAPSULE CAPSULE ORAL ONCE A DAY
Status: ACTIVE | COMMUNITY

## 2024-12-03 RX ORDER — INSULIN HUMAN 500 [IU]/ML
AS DIRECTED INJECTION, SOLUTION SUBCUTANEOUS
Status: ACTIVE | COMMUNITY

## 2024-12-03 RX ORDER — LUBIPROSTONE 24 UG/1
1 CAPSULE WITH FOOD AND WATER CAPSULE, GELATIN COATED ORAL TWICE A DAY
Qty: 180 CAPSULE | Refills: 3 | OUTPATIENT
Start: 2024-12-03 | End: 2025-11-28

## 2024-12-03 RX ORDER — SPIRONOLACTONE 50 MG/1
1 TABLET TABLET, FILM COATED ORAL ONCE A DAY
Status: ACTIVE | COMMUNITY

## 2024-12-03 RX ORDER — LACTULOSE 10 G/15ML
45 ML SOLUTION ORAL THREE TIMES A DAY
Qty: 4050 ML | Refills: 11 | OUTPATIENT

## 2024-12-03 RX ORDER — LACTULOSE 10 G/15ML
45 ML SOLUTION ORAL THREE TIMES A DAY
Qty: 4050 ML | Refills: 11 | Status: ACTIVE | COMMUNITY

## 2024-12-03 RX ORDER — HYDROCODONE BITARTRATE AND ACETAMINOPHEN 5; 325 MG/1; MG/1
1 TABLET AS NEEDED TABLET ORAL
Status: ACTIVE | COMMUNITY

## 2024-12-03 RX ORDER — PANTOPRAZOLE SODIUM 40 MG/1
1 TABLET TABLET, DELAYED RELEASE ORAL TWICE A DAY
Qty: 180 TABLET | Refills: 3 | Status: ACTIVE | COMMUNITY

## 2024-12-03 RX ORDER — INSULIN DETEMIR 100 [IU]/ML
INJECT BY SUBCUTANEOUS ROUTE PER PRESCRIBER'S INSTRUCTIONS. INSULIN DOSING REQUIRES INDIVIDUALIZATION INJECTION, SOLUTION SUBCUTANEOUS
Status: ACTIVE | COMMUNITY

## 2024-12-03 RX ORDER — FAMOTIDINE 40 MG/1
1 TABLET AT BEDTIME AS NEEDED TABLET, FILM COATED ORAL ONCE A DAY
Status: ACTIVE | COMMUNITY

## 2024-12-03 RX ORDER — MONTELUKAST SODIUM 10 MG/1
TAKE 1 TABLET (10 MG) BY ORAL ROUTE ONCE DAILY IN THE EVENING TABLET, FILM COATED ORAL 1
Status: ACTIVE | COMMUNITY

## 2024-12-03 RX ORDER — METFORMIN HYDROCHLORIDE 1000 MG/1
1 TABLET WITH A MEAL TABLET, FILM COATED ORAL ONCE A DAY
Status: ACTIVE | COMMUNITY

## 2024-12-03 RX ORDER — HYDROXYZINE HYDROCHLORIDE 25 MG/ML
2 ML AS NEEDED INJECTION, SOLUTION INTRAMUSCULAR
Status: ACTIVE | COMMUNITY

## 2024-12-03 RX ORDER — LACTULOSE 10 G/15ML
TAKE 45 ML BY MOUTH THREE TIMES A DAY SOLUTION ORAL; RECTAL
Qty: 4050 MILLILITER | Refills: 11 | Status: ACTIVE | COMMUNITY

## 2024-12-03 RX ORDER — CEFDINIR 300 MG/1
AS DIRECTED CAPSULE ORAL
Status: ACTIVE | COMMUNITY

## 2024-12-03 RX ORDER — ATORVASTATIN CALCIUM 40 MG/1
1 TABLET TABLET, FILM COATED ORAL ONCE A DAY
Status: ACTIVE | COMMUNITY

## 2024-12-03 RX ORDER — FUROSEMIDE 40 MG/1
TAKE 1 TABLET (40 MG) BY ORAL ROUTE 2 TIMES PER DAY TABLET ORAL 2
Status: ACTIVE | COMMUNITY

## 2024-12-03 RX ORDER — FAMOTIDINE 40 MG/1
1 TABLET AT BEDTIME AS NEEDED TABLET, FILM COATED ORAL ONCE A DAY
Qty: 90 TABLET | Refills: 3 | OUTPATIENT

## 2024-12-03 RX ORDER — LORAZEPAM 2 MG/1
1 TABLET AT BEDTIME AS NEEDED TABLET ORAL ONCE A DAY
Status: ACTIVE | COMMUNITY

## 2024-12-03 RX ORDER — LISINOPRIL 20 MG/1
TAKE 1 TABLET (20 MG) BY ORAL ROUTE ONCE DAILY TABLET ORAL 1
Status: ACTIVE | COMMUNITY

## 2024-12-03 RX ORDER — SUCRALFATE 1 G/1
1 TABLET ON AN EMPTY STOMACH TABLET ORAL
Qty: 120 TABLET | Refills: 11 | Status: ACTIVE | COMMUNITY

## 2024-12-03 RX ORDER — PANTOPRAZOLE SODIUM 40 MG/1
TAKE ONE TABLET BY MOUTH ONE TIME DAILY TABLET, DELAYED RELEASE ORAL
Status: ACTIVE | COMMUNITY

## 2024-12-03 RX ORDER — ASPIRIN 81 MG/1
1 TABLET TABLET, COATED ORAL ONCE A DAY
Status: ACTIVE | COMMUNITY

## 2024-12-03 RX ORDER — BRIMONIDINE TARTRATE 2 MG/MG
1 DROP INTO AFFECTED EYE SOLUTION/ DROPS OPHTHALMIC
Status: ACTIVE | COMMUNITY

## 2024-12-03 RX ORDER — HYDROCHLOROTHIAZIDE 25 MG/1
1 TABLET IN THE MORNING TABLET ORAL ONCE A DAY
Status: ACTIVE | COMMUNITY

## 2024-12-03 RX ORDER — PANTOPRAZOLE SODIUM 40 MG/1
1 TABLET TABLET, DELAYED RELEASE ORAL TWICE A DAY
Qty: 180 TABLET | Refills: 3

## 2024-12-03 NOTE — HPI-TODAY'S VISIT:
12/3/2024 Mr. Rodriguez is here for f/u of GERD and abnormal imaging. In 2020,  he had an EGD with a 1cm ulcer in the stomach and colonoscopy showed erythema with ulcers in the ascending colon. This was thought to be related to Goody's/NSAIDs. He has been off all NSAIDs. He denies any further stomach pain. He remains on prtoonix 40mg BID and pepcid at bedtime. he will have some breakthrough reflux and take tums with relief. He is on ozempic. He has lost about 10 pounds. He will be going up on the dose next month. He has been more constipated despite lactulose and dulcolax. CS
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
## 2025-02-27 ENCOUNTER — TELEPHONE ENCOUNTER (OUTPATIENT)
Dept: URBAN - NONMETROPOLITAN AREA CLINIC 2 | Facility: CLINIC | Age: 55
End: 2025-02-27

## 2025-06-03 ENCOUNTER — OFFICE VISIT (OUTPATIENT)
Dept: URBAN - NONMETROPOLITAN AREA CLINIC 13 | Facility: CLINIC | Age: 55
End: 2025-06-03
Payer: MEDICARE

## 2025-06-03 DIAGNOSIS — K59.04 CHRONIC IDIOPATHIC CONSTIPATION: ICD-10-CM

## 2025-06-03 DIAGNOSIS — K25.9 GASTRIC ULCER DUE TO CHEMICAL: ICD-10-CM

## 2025-06-03 DIAGNOSIS — R93.5 ABNORMAL FINDINGS ON DIAGNOSTIC IMAGING OF ABDOMEN: ICD-10-CM

## 2025-06-03 PROCEDURE — 99213 OFFICE O/P EST LOW 20 MIN: CPT | Performed by: NURSE PRACTITIONER

## 2025-06-03 RX ORDER — PANTOPRAZOLE SODIUM 40 MG/1
TAKE ONE TABLET BY MOUTH ONE TIME DAILY TABLET, DELAYED RELEASE ORAL
Status: ACTIVE | COMMUNITY

## 2025-06-03 RX ORDER — BRIMONIDINE TARTRATE 2 MG/MG
1 DROP INTO AFFECTED EYE SOLUTION/ DROPS OPHTHALMIC
Status: ACTIVE | COMMUNITY

## 2025-06-03 RX ORDER — CEFDINIR 300 MG/1
AS DIRECTED CAPSULE ORAL
Status: ON HOLD | COMMUNITY

## 2025-06-03 RX ORDER — PANTOPRAZOLE SODIUM 40 MG/1
1 TABLET TABLET, DELAYED RELEASE ORAL TWICE A DAY
Qty: 180 TABLET | Refills: 3 | Status: ACTIVE | COMMUNITY

## 2025-06-03 RX ORDER — LISINOPRIL 20 MG/1
TAKE 1 TABLET (20 MG) BY ORAL ROUTE ONCE DAILY TABLET ORAL 1
Status: ON HOLD | COMMUNITY

## 2025-06-03 RX ORDER — HYDROCHLOROTHIAZIDE 25 MG/1
1 TABLET IN THE MORNING TABLET ORAL ONCE A DAY
Status: ON HOLD | COMMUNITY

## 2025-06-03 RX ORDER — FAMOTIDINE 40 MG/1
1 TABLET AT BEDTIME AS NEEDED TABLET, FILM COATED ORAL ONCE A DAY
Qty: 90 TABLET | Refills: 3 | Status: ACTIVE | COMMUNITY

## 2025-06-03 RX ORDER — LORAZEPAM 2 MG/1
1 TABLET AT BEDTIME AS NEEDED TABLET ORAL ONCE A DAY
Status: ACTIVE | COMMUNITY

## 2025-06-03 RX ORDER — INSULIN DETEMIR 100 [IU]/ML
INJECT BY SUBCUTANEOUS ROUTE PER PRESCRIBER'S INSTRUCTIONS. INSULIN DOSING REQUIRES INDIVIDUALIZATION INJECTION, SOLUTION SUBCUTANEOUS
Status: ON HOLD | COMMUNITY

## 2025-06-03 RX ORDER — FAMOTIDINE 40 MG/1
1 TABLET AT BEDTIME TABLET, FILM COATED ORAL ONCE A DAY
Qty: 90 TABLET | Refills: 3 | Status: ACTIVE | COMMUNITY

## 2025-06-03 RX ORDER — PANTOPRAZOLE SODIUM 40 MG/1
1 TABLET TABLET, DELAYED RELEASE ORAL TWICE A DAY
Qty: 180 TABLET | Refills: 3
Start: 2025-06-03

## 2025-06-03 RX ORDER — FAMOTIDINE 40 MG/1
1 TABLET AT BEDTIME AS NEEDED TABLET, FILM COATED ORAL ONCE A DAY
Status: ACTIVE | COMMUNITY

## 2025-06-03 RX ORDER — METFORMIN HYDROCHLORIDE 1000 MG/1
1 TABLET WITH A MEAL TABLET, FILM COATED ORAL ONCE A DAY
Status: ACTIVE | COMMUNITY

## 2025-06-03 RX ORDER — MONTELUKAST SODIUM 10 MG/1
TAKE 1 TABLET (10 MG) BY ORAL ROUTE ONCE DAILY IN THE EVENING TABLET, FILM COATED ORAL 1
Status: ON HOLD | COMMUNITY

## 2025-06-03 RX ORDER — SUCRALFATE 1 G/1
1 TABLET ON AN EMPTY STOMACH TABLET ORAL
Qty: 120 TABLET | Refills: 11

## 2025-06-03 RX ORDER — LUBIPROSTONE 24 UG/1
1 CAPSULE WITH FOOD AND WATER CAPSULE, GELATIN COATED ORAL TWICE A DAY
Qty: 180 CAPSULE | Refills: 3 | Status: ACTIVE | COMMUNITY
Start: 2024-12-03 | End: 2025-11-28

## 2025-06-03 RX ORDER — ATORVASTATIN CALCIUM 40 MG/1
1 TABLET TABLET, FILM COATED ORAL ONCE A DAY
Status: ACTIVE | COMMUNITY

## 2025-06-03 RX ORDER — CITALOPRAM 20 MG/1
TAKE 1 TABLET (20 MG) BY ORAL ROUTE ONCE DAILY TABLET ORAL 1
Status: ON HOLD | COMMUNITY

## 2025-06-03 RX ORDER — LACTULOSE 10 G/15ML
45 ML SOLUTION ORAL THREE TIMES A DAY
Qty: 4050 ML | Refills: 11 | OUTPATIENT
Start: 2025-06-03

## 2025-06-03 RX ORDER — ASPIRIN 81 MG/1
1 TABLET TABLET, COATED ORAL ONCE A DAY
Status: ACTIVE | COMMUNITY

## 2025-06-03 RX ORDER — FUROSEMIDE 40 MG/1
TAKE 1 TABLET (40 MG) BY ORAL ROUTE 2 TIMES PER DAY TABLET ORAL 2
Status: ACTIVE | COMMUNITY

## 2025-06-03 RX ORDER — HYDROXYZINE HYDROCHLORIDE 25 MG/ML
2 ML AS NEEDED INJECTION, SOLUTION INTRAMUSCULAR
Status: ON HOLD | COMMUNITY

## 2025-06-03 RX ORDER — SPIRONOLACTONE 50 MG/1
1 TABLET TABLET, FILM COATED ORAL ONCE A DAY
Status: ACTIVE | COMMUNITY

## 2025-06-03 RX ORDER — LACTULOSE 10 G/15ML
TAKE 45 ML BY MOUTH THREE TIMES A DAY SOLUTION ORAL; RECTAL
Qty: 4050 MILLILITER | Refills: 11 | Status: ACTIVE | COMMUNITY

## 2025-06-03 RX ORDER — LACTULOSE 10 G/15ML
45 ML SOLUTION ORAL THREE TIMES A DAY
Qty: 4050 ML | Refills: 11 | Status: ACTIVE | COMMUNITY

## 2025-06-03 RX ORDER — GABAPENTIN 100 MG/1
1 CAPSULE CAPSULE ORAL ONCE A DAY
Status: ACTIVE | COMMUNITY

## 2025-06-03 RX ORDER — FAMOTIDINE 40 MG/1
1 TABLET AT BEDTIME AS NEEDED TABLET, FILM COATED ORAL ONCE A DAY
Qty: 90 TABLET | Refills: 3 | OUTPATIENT
Start: 2025-06-03

## 2025-06-03 RX ORDER — SUCRALFATE 1 G/1
1 TABLET ON AN EMPTY STOMACH TABLET ORAL
Qty: 120 TABLET | Refills: 11 | Status: ACTIVE | COMMUNITY

## 2025-06-03 RX ORDER — HYDROCODONE BITARTRATE AND ACETAMINOPHEN 5; 325 MG/1; MG/1
1 TABLET AS NEEDED TABLET ORAL
Status: ON HOLD | COMMUNITY

## 2025-06-03 RX ORDER — INSULIN HUMAN 500 [IU]/ML
AS DIRECTED INJECTION, SOLUTION SUBCUTANEOUS
Status: ACTIVE | COMMUNITY

## 2025-06-03 NOTE — HPI-TODAY'S VISIT:
6/3/2025 Mr. Rodriguez is here for f/u of GERD and abnormal imaging. In 2020,  he had an EGD with a 1cm ulcer in the stomach and colonoscopy showed erythema with ulcers in the ascending colon. This was thought to be related to Goody's/NSAIDs. He has been off all NSAIDs until the last month- he had some neuropathy pain and took a Goodys. He denies any stomach pain. He remains on prtoonix 40mg BID and pepcid at bedtime and carafate alternating. He is on ozempic. He has lost about 20 pounds. He denies any SE. His constipation is controlled with lactulose. He is off O2. He has improvement in his lung function and kidney function. He is doing great today.  CS
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
## 2025-08-13 ENCOUNTER — TELEPHONE ENCOUNTER (OUTPATIENT)
Dept: URBAN - NONMETROPOLITAN AREA CLINIC 2 | Facility: CLINIC | Age: 55
End: 2025-08-13

## 2025-08-13 RX ORDER — FAMOTIDINE 40 MG/1
1 TABLET AT BEDTIME AS NEEDED TABLET, FILM COATED ORAL ONCE A DAY
Qty: 90 TABLET | Refills: 3
Start: 2025-06-03